# Patient Record
Sex: FEMALE | Race: BLACK OR AFRICAN AMERICAN | NOT HISPANIC OR LATINO | Employment: OTHER | URBAN - METROPOLITAN AREA
[De-identification: names, ages, dates, MRNs, and addresses within clinical notes are randomized per-mention and may not be internally consistent; named-entity substitution may affect disease eponyms.]

---

## 2019-07-26 ENCOUNTER — APPOINTMENT (OUTPATIENT)
Dept: GENERAL RADIOLOGY | Facility: HOSPITAL | Age: 64
End: 2019-07-26

## 2019-07-26 ENCOUNTER — HOSPITAL ENCOUNTER (INPATIENT)
Facility: HOSPITAL | Age: 64
LOS: 9 days | Discharge: HOME | End: 2019-08-04
Attending: EMERGENCY MEDICINE | Admitting: INTERNAL MEDICINE

## 2019-07-26 DIAGNOSIS — E11.10 DIABETIC KETOACIDOSIS WITHOUT COMA ASSOCIATED WITH TYPE 2 DIABETES MELLITUS (HCC): Primary | ICD-10-CM

## 2019-07-26 PROBLEM — N18.2 CKD (CHRONIC KIDNEY DISEASE), STAGE II: Status: ACTIVE | Noted: 2019-07-26

## 2019-07-26 PROBLEM — Z95.2 HISTORY OF HEART VALVE REPLACEMENT: Status: ACTIVE | Noted: 2019-07-26

## 2019-07-26 PROBLEM — Z79.01 CHRONIC ANTICOAGULATION: Status: ACTIVE | Noted: 2019-07-26

## 2019-07-26 PROBLEM — N39.0 UTI (URINARY TRACT INFECTION), BACTERIAL: Status: ACTIVE | Noted: 2019-07-26

## 2019-07-26 PROBLEM — A49.9 UTI (URINARY TRACT INFECTION), BACTERIAL: Status: ACTIVE | Noted: 2019-07-26

## 2019-07-26 PROBLEM — I10 HYPERTENSION: Status: ACTIVE | Noted: 2019-07-26

## 2019-07-26 LAB
ALBUMIN SERPL-MCNC: 4.3 G/DL (ref 3.5–5.2)
ALBUMIN/GLOB SERPL: 1.3 G/DL
ALP SERPL-CCNC: 137 U/L (ref 39–117)
ALT SERPL W P-5'-P-CCNC: 31 U/L (ref 1–33)
ANION GAP SERPL CALCULATED.3IONS-SCNC: 19 MMOL/L (ref 5–15)
ANION GAP SERPL CALCULATED.3IONS-SCNC: 24 MMOL/L (ref 5–15)
AST SERPL-CCNC: 45 U/L (ref 1–32)
ATMOSPHERIC PRESS: ABNORMAL MMHG
B-OH-BUTYR SERPL-SCNC: 4.96 MMOL/L (ref 0.02–0.27)
BACTERIA UR QL AUTO: ABNORMAL /HPF
BASE EXCESS BLDV CALC-SCNC: -6.6 MMOL/L (ref -2–2)
BASOPHILS # BLD AUTO: 0.04 10*3/MM3 (ref 0–0.2)
BASOPHILS NFR BLD AUTO: 0.5 % (ref 0–1.5)
BDY SITE: ABNORMAL
BILIRUB SERPL-MCNC: 1 MG/DL (ref 0.2–1.2)
BILIRUB UR QL STRIP: NEGATIVE
BODY TEMPERATURE: 37 C
BUN BLD-MCNC: 53 MG/DL (ref 8–23)
BUN BLD-MCNC: 62 MG/DL (ref 8–23)
BUN/CREAT SERPL: 34 (ref 7–25)
BUN/CREAT SERPL: 36.3 (ref 7–25)
CALCIUM SPEC-SCNC: 10.1 MG/DL (ref 8.6–10.5)
CALCIUM SPEC-SCNC: 10.3 MG/DL (ref 8.6–10.5)
CHLORIDE SERPL-SCNC: 92 MMOL/L (ref 98–107)
CHLORIDE SERPL-SCNC: 99 MMOL/L (ref 98–107)
CLARITY UR: ABNORMAL
CO2 BLDA-SCNC: 20.1 MMOL/L (ref 23–27)
CO2 SERPL-SCNC: 17 MMOL/L (ref 22–29)
CO2 SERPL-SCNC: 20 MMOL/L (ref 22–29)
COHGB MFR BLD: 2 %
COLOR UR: YELLOW
CREAT BLD-MCNC: 1.56 MG/DL (ref 0.57–1)
CREAT BLD-MCNC: 1.71 MG/DL (ref 0.57–1)
D-LACTATE SERPL-SCNC: 2.2 MMOL/L (ref 0.5–2)
D-LACTATE SERPL-SCNC: 5.8 MMOL/L (ref 0.5–2)
DEPRECATED RDW RBC AUTO: 59.2 FL (ref 37–54)
EOSINOPHIL # BLD AUTO: 0.13 10*3/MM3 (ref 0–0.4)
EOSINOPHIL NFR BLD AUTO: 1.6 % (ref 0.3–6.2)
ERYTHROCYTE [DISTWIDTH] IN BLOOD BY AUTOMATED COUNT: 15.9 % (ref 12.3–15.4)
GFR SERPL CREATININE-BSD FRML MDRD: 36 ML/MIN/1.73
GFR SERPL CREATININE-BSD FRML MDRD: 40 ML/MIN/1.73
GLOBULIN UR ELPH-MCNC: 3.4 GM/DL
GLUCOSE BLD-MCNC: 207 MG/DL (ref 65–99)
GLUCOSE BLD-MCNC: 618 MG/DL (ref 65–99)
GLUCOSE BLDC GLUCOMTR-MCNC: 119 MG/DL (ref 70–130)
GLUCOSE BLDC GLUCOMTR-MCNC: 135 MG/DL (ref 70–130)
GLUCOSE BLDC GLUCOMTR-MCNC: 153 MG/DL (ref 70–130)
GLUCOSE BLDC GLUCOMTR-MCNC: 204 MG/DL (ref 70–130)
GLUCOSE BLDC GLUCOMTR-MCNC: 243 MG/DL (ref 70–130)
GLUCOSE BLDC GLUCOMTR-MCNC: 352 MG/DL (ref 70–130)
GLUCOSE BLDC GLUCOMTR-MCNC: 362 MG/DL (ref 70–130)
GLUCOSE BLDC GLUCOMTR-MCNC: 445 MG/DL (ref 70–130)
GLUCOSE BLDC GLUCOMTR-MCNC: 445 MG/DL (ref 70–130)
GLUCOSE BLDC GLUCOMTR-MCNC: 460 MG/DL (ref 70–130)
GLUCOSE BLDC GLUCOMTR-MCNC: 521 MG/DL (ref 70–130)
GLUCOSE BLDC GLUCOMTR-MCNC: 521 MG/DL (ref 70–130)
GLUCOSE BLDC GLUCOMTR-MCNC: 534 MG/DL (ref 70–130)
GLUCOSE UR STRIP-MCNC: ABNORMAL MG/DL
HBA1C MFR BLD: 8.4 % (ref 4.8–5.6)
HCO3 BLDV-SCNC: 19 MMOL/L (ref 22–28)
HCT VFR BLD AUTO: 33.1 % (ref 34–46.6)
HGB BLD-MCNC: 10.3 G/DL (ref 12–15.9)
HGB BLDA-MCNC: 10.7 G/DL (ref 14–18)
HGB UR QL STRIP.AUTO: ABNORMAL
HOLD SPECIMEN: NORMAL
HOROWITZ INDEX BLD+IHG-RTO: 21 %
HYALINE CASTS UR QL AUTO: ABNORMAL /LPF
IMM GRANULOCYTES # BLD AUTO: 0.05 10*3/MM3 (ref 0–0.05)
IMM GRANULOCYTES NFR BLD AUTO: 0.6 % (ref 0–0.5)
INR PPP: 2.63 (ref 0.85–1.16)
KETONES UR QL STRIP: ABNORMAL
LEUKOCYTE ESTERASE UR QL STRIP.AUTO: ABNORMAL
LYMPHOCYTES # BLD AUTO: 0.29 10*3/MM3 (ref 0.7–3.1)
LYMPHOCYTES NFR BLD AUTO: 3.5 % (ref 19.6–45.3)
MAGNESIUM SERPL-MCNC: 2.3 MG/DL (ref 1.6–2.4)
MAGNESIUM SERPL-MCNC: 2.4 MG/DL (ref 1.6–2.4)
MCH RBC QN AUTO: 31.7 PG (ref 26.6–33)
MCHC RBC AUTO-ENTMCNC: 31.1 G/DL (ref 31.5–35.7)
MCV RBC AUTO: 101.8 FL (ref 79–97)
METHGB BLD QL: 0.9 %
MODALITY: ABNORMAL
MONOCYTES # BLD AUTO: 0.49 10*3/MM3 (ref 0.1–0.9)
MONOCYTES NFR BLD AUTO: 6 % (ref 5–12)
NEUTROPHILS # BLD AUTO: 7.19 10*3/MM3 (ref 1.7–7)
NEUTROPHILS NFR BLD AUTO: 87.8 % (ref 42.7–76)
NITRITE UR QL STRIP: NEGATIVE
NOTE: ABNORMAL
NRBC BLD AUTO-RTO: 0 /100 WBC (ref 0–0.2)
OXYHGB MFR BLDV: 43.5 %
PCO2 BLDV: 37.4 MM HG (ref 41–51)
PH BLDV: 7.31 PH UNITS
PH UR STRIP.AUTO: <=5 [PH] (ref 5–8)
PHOSPHATE SERPL-MCNC: 3.5 MG/DL (ref 2.5–4.5)
PHOSPHATE SERPL-MCNC: 4.4 MG/DL (ref 2.5–4.5)
PLATELET # BLD AUTO: 278 10*3/MM3 (ref 140–450)
PMV BLD AUTO: 10.9 FL (ref 6–12)
PO2 BLDV: 27.1 MM HG (ref 27–53)
POTASSIUM BLD-SCNC: 4.7 MMOL/L (ref 3.5–5.2)
POTASSIUM BLD-SCNC: 4.7 MMOL/L (ref 3.5–5.2)
PROT SERPL-MCNC: 7.7 G/DL (ref 6–8.5)
PROT UR QL STRIP: ABNORMAL
PROTHROMBIN TIME: 27.1 SECONDS (ref 11.2–14.3)
RBC # BLD AUTO: 3.25 10*6/MM3 (ref 3.77–5.28)
RBC # UR: ABNORMAL /HPF
REF LAB TEST METHOD: ABNORMAL
SODIUM BLD-SCNC: 133 MMOL/L (ref 136–145)
SODIUM BLD-SCNC: 138 MMOL/L (ref 136–145)
SP GR UR STRIP: 1.01 (ref 1–1.03)
SQUAMOUS #/AREA URNS HPF: ABNORMAL /HPF
UROBILINOGEN UR QL STRIP: ABNORMAL
VENTILATOR MODE: ABNORMAL
WBC NRBC COR # BLD: 8.19 10*3/MM3 (ref 3.4–10.8)
WBC UR QL AUTO: ABNORMAL /HPF
WHOLE BLOOD HOLD SPECIMEN: NORMAL
WHOLE BLOOD HOLD SPECIMEN: NORMAL

## 2019-07-26 PROCEDURE — 87086 URINE CULTURE/COLONY COUNT: CPT | Performed by: FAMILY MEDICINE

## 2019-07-26 PROCEDURE — 87040 BLOOD CULTURE FOR BACTERIA: CPT | Performed by: EMERGENCY MEDICINE

## 2019-07-26 PROCEDURE — A9270 NON-COVERED ITEM OR SERVICE: HCPCS

## 2019-07-26 PROCEDURE — A9270 NON-COVERED ITEM OR SERVICE: HCPCS | Performed by: PHYSICIAN ASSISTANT

## 2019-07-26 PROCEDURE — 80053 COMPREHEN METABOLIC PANEL: CPT | Performed by: EMERGENCY MEDICINE

## 2019-07-26 PROCEDURE — A9270 NON-COVERED ITEM OR SERVICE: HCPCS | Performed by: FAMILY MEDICINE

## 2019-07-26 PROCEDURE — 83735 ASSAY OF MAGNESIUM: CPT | Performed by: FAMILY MEDICINE

## 2019-07-26 PROCEDURE — 82820 HEMOGLOBIN-OXYGEN AFFINITY: CPT

## 2019-07-26 PROCEDURE — 83605 ASSAY OF LACTIC ACID: CPT | Performed by: EMERGENCY MEDICINE

## 2019-07-26 PROCEDURE — 63710000001 INSULIN REGULAR HUMAN PER 5 UNITS: Performed by: FAMILY MEDICINE

## 2019-07-26 PROCEDURE — 87186 SC STD MICRODIL/AGAR DIL: CPT | Performed by: FAMILY MEDICINE

## 2019-07-26 PROCEDURE — A9270 NON-COVERED ITEM OR SERVICE: HCPCS | Performed by: NURSE PRACTITIONER

## 2019-07-26 PROCEDURE — 99291 CRITICAL CARE FIRST HOUR: CPT | Performed by: FAMILY MEDICINE

## 2019-07-26 PROCEDURE — 99291 CRITICAL CARE FIRST HOUR: CPT

## 2019-07-26 PROCEDURE — 85025 COMPLETE CBC W/AUTO DIFF WBC: CPT

## 2019-07-26 PROCEDURE — 85610 PROTHROMBIN TIME: CPT | Performed by: PHYSICIAN ASSISTANT

## 2019-07-26 PROCEDURE — 81001 URINALYSIS AUTO W/SCOPE: CPT | Performed by: FAMILY MEDICINE

## 2019-07-26 PROCEDURE — 71045 X-RAY EXAM CHEST 1 VIEW: CPT

## 2019-07-26 PROCEDURE — 63710000001 INSULIN REGULAR HUMAN PER 5 UNITS: Performed by: PHYSICIAN ASSISTANT

## 2019-07-26 PROCEDURE — 63710000001 BUDESONIDE-FORMOTEROL 80-4.5 MCG/ACT AEROSOL 6.9 G INHALER: Performed by: FAMILY MEDICINE

## 2019-07-26 PROCEDURE — 87088 URINE BACTERIA CULTURE: CPT | Performed by: FAMILY MEDICINE

## 2019-07-26 PROCEDURE — 83036 HEMOGLOBIN GLYCOSYLATED A1C: CPT | Performed by: FAMILY MEDICINE

## 2019-07-26 PROCEDURE — 63710000001 WARFARIN 7.5 MG TABLET

## 2019-07-26 PROCEDURE — 82962 GLUCOSE BLOOD TEST: CPT

## 2019-07-26 PROCEDURE — 63710000001 ACETAMINOPHEN 650 MG SUPPOSITORY: Performed by: NURSE PRACTITIONER

## 2019-07-26 PROCEDURE — 25810000003 SODIUM CHLORIDE 0.9 % WITH KCL 20 MEQ 20-0.9 MEQ/L-% SOLUTION: Performed by: FAMILY MEDICINE

## 2019-07-26 PROCEDURE — 94799 UNLISTED PULMONARY SVC/PX: CPT

## 2019-07-26 PROCEDURE — 87186 SC STD MICRODIL/AGAR DIL: CPT | Performed by: EMERGENCY MEDICINE

## 2019-07-26 PROCEDURE — 84100 ASSAY OF PHOSPHORUS: CPT | Performed by: FAMILY MEDICINE

## 2019-07-26 PROCEDURE — 82010 KETONE BODYS QUAN: CPT | Performed by: EMERGENCY MEDICINE

## 2019-07-26 PROCEDURE — 25010000002 ONDANSETRON PER 1 MG: Performed by: PHYSICIAN ASSISTANT

## 2019-07-26 PROCEDURE — 82805 BLOOD GASES W/O2 SATURATION: CPT

## 2019-07-26 PROCEDURE — 87150 DNA/RNA AMPLIFIED PROBE: CPT | Performed by: EMERGENCY MEDICINE

## 2019-07-26 PROCEDURE — 25010000002 CEFTRIAXONE PER 250 MG: Performed by: FAMILY MEDICINE

## 2019-07-26 PROCEDURE — 25010000002 ONDANSETRON PER 1 MG: Performed by: FAMILY MEDICINE

## 2019-07-26 PROCEDURE — 63710000001 CARVEDILOL 6.25 MG TABLET: Performed by: FAMILY MEDICINE

## 2019-07-26 RX ORDER — PANTOPRAZOLE SODIUM 40 MG/1
40 TABLET, DELAYED RELEASE ORAL DAILY
Status: DISCONTINUED | OUTPATIENT
Start: 2019-07-27 | End: 2019-08-04 | Stop reason: HOSPADM

## 2019-07-26 RX ORDER — ISOSORBIDE MONONITRATE 30 MG/1
30 TABLET, EXTENDED RELEASE ORAL DAILY
Status: DISCONTINUED | OUTPATIENT
Start: 2019-07-27 | End: 2019-07-27

## 2019-07-26 RX ORDER — SODIUM CHLORIDE 0.9 % (FLUSH) 0.9 %
30 SYRINGE (ML) INJECTION ONCE AS NEEDED
Status: DISCONTINUED | OUTPATIENT
Start: 2019-07-26 | End: 2019-08-04 | Stop reason: HOSPADM

## 2019-07-26 RX ORDER — CARVEDILOL 6.25 MG/1
6.25 TABLET ORAL 2 TIMES DAILY WITH MEALS
COMMUNITY

## 2019-07-26 RX ORDER — PANTOPRAZOLE SODIUM 40 MG/1
40 TABLET, DELAYED RELEASE ORAL DAILY
COMMUNITY

## 2019-07-26 RX ORDER — WARFARIN SODIUM 7.5 MG/1
3.75 TABLET ORAL SEE ADMIN INSTRUCTIONS
COMMUNITY

## 2019-07-26 RX ORDER — BUDESONIDE AND FORMOTEROL FUMARATE DIHYDRATE 80; 4.5 UG/1; UG/1
2 AEROSOL RESPIRATORY (INHALATION)
Status: DISCONTINUED | OUTPATIENT
Start: 2019-07-26 | End: 2019-08-04 | Stop reason: HOSPADM

## 2019-07-26 RX ORDER — ONDANSETRON 2 MG/ML
4 INJECTION INTRAMUSCULAR; INTRAVENOUS ONCE
Status: COMPLETED | OUTPATIENT
Start: 2019-07-26 | End: 2019-07-26

## 2019-07-26 RX ORDER — ACETAMINOPHEN 650 MG/1
650 SUPPOSITORY RECTAL EVERY 4 HOURS PRN
Status: DISCONTINUED | OUTPATIENT
Start: 2019-07-26 | End: 2019-08-04 | Stop reason: HOSPADM

## 2019-07-26 RX ORDER — ALLOPURINOL 100 MG/1
100 TABLET ORAL DAILY
Status: ON HOLD | COMMUNITY
End: 2019-07-27

## 2019-07-26 RX ORDER — CARVEDILOL 6.25 MG/1
6.25 TABLET ORAL 2 TIMES DAILY WITH MEALS
Status: DISCONTINUED | OUTPATIENT
Start: 2019-07-26 | End: 2019-08-04 | Stop reason: HOSPADM

## 2019-07-26 RX ORDER — WARFARIN SODIUM 7.5 MG/1
7.5 TABLET ORAL
Status: DISCONTINUED | OUTPATIENT
Start: 2019-07-26 | End: 2019-07-26

## 2019-07-26 RX ORDER — ONDANSETRON 2 MG/ML
4 INJECTION INTRAMUSCULAR; INTRAVENOUS EVERY 6 HOURS PRN
Status: DISCONTINUED | OUTPATIENT
Start: 2019-07-26 | End: 2019-08-04 | Stop reason: HOSPADM

## 2019-07-26 RX ORDER — ISOSORBIDE MONONITRATE 30 MG/1
30 TABLET, EXTENDED RELEASE ORAL DAILY
COMMUNITY

## 2019-07-26 RX ORDER — INSULIN GLARGINE 100 [IU]/ML
14 INJECTION, SOLUTION SUBCUTANEOUS 2 TIMES DAILY
Status: ON HOLD | COMMUNITY
End: 2019-08-04 | Stop reason: SDUPTHER

## 2019-07-26 RX ORDER — FEXOFENADINE HCL 180 MG/1
180 TABLET ORAL DAILY
COMMUNITY
End: 2019-07-26

## 2019-07-26 RX ORDER — SODIUM CHLORIDE 0.9 % (FLUSH) 0.9 %
10 SYRINGE (ML) INJECTION ONCE AS NEEDED
Status: DISCONTINUED | OUTPATIENT
Start: 2019-07-26 | End: 2019-08-04 | Stop reason: HOSPADM

## 2019-07-26 RX ORDER — ALLOPURINOL 300 MG/1
300 TABLET ORAL DAILY
COMMUNITY
Start: 2019-07-26

## 2019-07-26 RX ORDER — ACETAMINOPHEN 325 MG/1
650 TABLET ORAL EVERY 4 HOURS PRN
Status: DISCONTINUED | OUTPATIENT
Start: 2019-07-26 | End: 2019-08-04 | Stop reason: HOSPADM

## 2019-07-26 RX ORDER — FLUTICASONE PROPIONATE 50 MCG
2 SPRAY, SUSPENSION (ML) NASAL DAILY PRN
COMMUNITY

## 2019-07-26 RX ORDER — WARFARIN SODIUM 7.5 MG/1
7.5 TABLET ORAL SEE ADMIN INSTRUCTIONS
COMMUNITY

## 2019-07-26 RX ORDER — ATORVASTATIN CALCIUM 20 MG/1
20 TABLET, FILM COATED ORAL DAILY
Status: DISCONTINUED | OUTPATIENT
Start: 2019-07-27 | End: 2019-08-04 | Stop reason: HOSPADM

## 2019-07-26 RX ORDER — SODIUM CHLORIDE 0.9 % (FLUSH) 0.9 %
3-10 SYRINGE (ML) INJECTION AS NEEDED
Status: DISCONTINUED | OUTPATIENT
Start: 2019-07-26 | End: 2019-08-04 | Stop reason: HOSPADM

## 2019-07-26 RX ORDER — NITROGLYCERIN 0.4 MG/1
0.4 TABLET SUBLINGUAL
COMMUNITY

## 2019-07-26 RX ORDER — SODIUM CHLORIDE 9 MG/ML
10 INJECTION, SOLUTION INTRAVENOUS CONTINUOUS PRN
Status: DISCONTINUED | OUTPATIENT
Start: 2019-07-26 | End: 2019-08-04 | Stop reason: HOSPADM

## 2019-07-26 RX ORDER — BISACODYL 5 MG/1
5 TABLET, DELAYED RELEASE ORAL DAILY PRN
Status: DISCONTINUED | OUTPATIENT
Start: 2019-07-26 | End: 2019-08-04 | Stop reason: HOSPADM

## 2019-07-26 RX ORDER — SACCHAROMYCES BOULARDII 250 MG
250 CAPSULE ORAL DAILY
Status: DISCONTINUED | OUTPATIENT
Start: 2019-07-27 | End: 2019-08-04 | Stop reason: HOSPADM

## 2019-07-26 RX ORDER — LEVOTHYROXINE SODIUM 0.15 MG/1
150 TABLET ORAL
Status: DISCONTINUED | OUTPATIENT
Start: 2019-07-27 | End: 2019-08-04 | Stop reason: HOSPADM

## 2019-07-26 RX ORDER — LEVOTHYROXINE SODIUM 0.15 MG/1
150 TABLET ORAL DAILY
COMMUNITY

## 2019-07-26 RX ORDER — WARFARIN SODIUM 7.5 MG/1
3.75 TABLET ORAL
Status: DISCONTINUED | OUTPATIENT
Start: 2019-07-26 | End: 2019-07-28

## 2019-07-26 RX ORDER — CALCIUM CARBONATE 200(500)MG
2 TABLET,CHEWABLE ORAL 2 TIMES DAILY PRN
Status: DISCONTINUED | OUTPATIENT
Start: 2019-07-26 | End: 2019-08-04 | Stop reason: HOSPADM

## 2019-07-26 RX ORDER — SODIUM CHLORIDE AND POTASSIUM CHLORIDE 150; 900 MG/100ML; MG/100ML
250 INJECTION, SOLUTION INTRAVENOUS CONTINUOUS PRN
Status: DISCONTINUED | OUTPATIENT
Start: 2019-07-26 | End: 2019-08-04 | Stop reason: HOSPADM

## 2019-07-26 RX ORDER — SODIUM CHLORIDE 450 MG/100ML
250 INJECTION, SOLUTION INTRAVENOUS CONTINUOUS PRN
Status: DISCONTINUED | OUTPATIENT
Start: 2019-07-26 | End: 2019-08-04 | Stop reason: HOSPADM

## 2019-07-26 RX ORDER — SIMVASTATIN 40 MG
40 TABLET ORAL NIGHTLY
COMMUNITY

## 2019-07-26 RX ORDER — CETIRIZINE HYDROCHLORIDE 10 MG/1
5 TABLET ORAL DAILY
Status: DISCONTINUED | OUTPATIENT
Start: 2019-07-27 | End: 2019-08-04 | Stop reason: HOSPADM

## 2019-07-26 RX ORDER — SODIUM CHLORIDE 9 MG/ML
30 INJECTION, SOLUTION INTRAVENOUS CONTINUOUS PRN
Status: DISCONTINUED | OUTPATIENT
Start: 2019-07-26 | End: 2019-08-04 | Stop reason: HOSPADM

## 2019-07-26 RX ORDER — FUROSEMIDE 20 MG/1
20 TABLET ORAL 2 TIMES DAILY
Status: ON HOLD | COMMUNITY
End: 2019-08-04 | Stop reason: SDUPTHER

## 2019-07-26 RX ORDER — WARFARIN SODIUM 7.5 MG/1
7.5 TABLET ORAL
Status: DISCONTINUED | OUTPATIENT
Start: 2019-07-27 | End: 2019-07-27

## 2019-07-26 RX ORDER — HYDRALAZINE HYDROCHLORIDE 50 MG/1
50 TABLET, FILM COATED ORAL 2 TIMES DAILY
COMMUNITY

## 2019-07-26 RX ORDER — SODIUM CHLORIDE 0.9 % (FLUSH) 0.9 %
3 SYRINGE (ML) INJECTION EVERY 12 HOURS SCHEDULED
Status: DISCONTINUED | OUTPATIENT
Start: 2019-07-26 | End: 2019-08-04 | Stop reason: HOSPADM

## 2019-07-26 RX ORDER — SACCHAROMYCES BOULARDII 250 MG
250 CAPSULE ORAL 2 TIMES DAILY
COMMUNITY
End: 2019-07-26

## 2019-07-26 RX ORDER — SODIUM CHLORIDE 0.9 % (FLUSH) 0.9 %
10 SYRINGE (ML) INJECTION AS NEEDED
Status: DISCONTINUED | OUTPATIENT
Start: 2019-07-26 | End: 2019-08-04 | Stop reason: HOSPADM

## 2019-07-26 RX ORDER — ONDANSETRON 4 MG/1
4 TABLET, ORALLY DISINTEGRATING ORAL EVERY 8 HOURS PRN
COMMUNITY

## 2019-07-26 RX ORDER — SODIUM CHLORIDE AND POTASSIUM CHLORIDE 300; 900 MG/100ML; MG/100ML
250 INJECTION, SOLUTION INTRAVENOUS CONTINUOUS PRN
Status: DISCONTINUED | OUTPATIENT
Start: 2019-07-26 | End: 2019-08-04 | Stop reason: HOSPADM

## 2019-07-26 RX ORDER — FERROUS SULFATE 325(65) MG
325 TABLET ORAL
COMMUNITY

## 2019-07-26 RX ORDER — ASPIRIN 81 MG/1
81 TABLET ORAL DAILY
COMMUNITY

## 2019-07-26 RX ORDER — ALLOPURINOL 100 MG/1
100 TABLET ORAL DAILY
Status: DISCONTINUED | OUTPATIENT
Start: 2019-07-27 | End: 2019-08-04 | Stop reason: HOSPADM

## 2019-07-26 RX ORDER — DEXTROSE MONOHYDRATE 25 G/50ML
12.5 INJECTION, SOLUTION INTRAVENOUS AS NEEDED
Status: DISCONTINUED | OUTPATIENT
Start: 2019-07-26 | End: 2019-08-04 | Stop reason: HOSPADM

## 2019-07-26 RX ORDER — DEXTROSE AND SODIUM CHLORIDE 5; .45 G/100ML; G/100ML
250 INJECTION, SOLUTION INTRAVENOUS CONTINUOUS PRN
Status: DISCONTINUED | OUTPATIENT
Start: 2019-07-26 | End: 2019-08-04 | Stop reason: HOSPADM

## 2019-07-26 RX ORDER — DEXTROSE, SODIUM CHLORIDE, AND POTASSIUM CHLORIDE 5; .45; .3 G/100ML; G/100ML; G/100ML
250 INJECTION INTRAVENOUS CONTINUOUS PRN
Status: DISCONTINUED | OUTPATIENT
Start: 2019-07-26 | End: 2019-08-04 | Stop reason: HOSPADM

## 2019-07-26 RX ORDER — DEXTROSE AND SODIUM CHLORIDE 5; .45 G/100ML; G/100ML
125 INJECTION, SOLUTION INTRAVENOUS CONTINUOUS
Status: DISCONTINUED | OUTPATIENT
Start: 2019-07-27 | End: 2019-07-27

## 2019-07-26 RX ORDER — DEXTROSE MONOHYDRATE 50 MG/ML
50 INJECTION, SOLUTION INTRAVENOUS CONTINUOUS
Status: DISCONTINUED | OUTPATIENT
Start: 2019-07-27 | End: 2019-07-26

## 2019-07-26 RX ORDER — FERROUS SULFATE 325(65) MG
325 TABLET ORAL
Status: DISCONTINUED | OUTPATIENT
Start: 2019-07-27 | End: 2019-08-04 | Stop reason: HOSPADM

## 2019-07-26 RX ORDER — SODIUM CHLORIDE 9 MG/ML
250 INJECTION, SOLUTION INTRAVENOUS CONTINUOUS PRN
Status: DISCONTINUED | OUTPATIENT
Start: 2019-07-26 | End: 2019-08-04 | Stop reason: HOSPADM

## 2019-07-26 RX ORDER — SODIUM CHLORIDE AND POTASSIUM CHLORIDE 150; 450 MG/100ML; MG/100ML
250 INJECTION, SOLUTION INTRAVENOUS CONTINUOUS PRN
Status: DISCONTINUED | OUTPATIENT
Start: 2019-07-26 | End: 2019-08-04 | Stop reason: HOSPADM

## 2019-07-26 RX ORDER — DEXTROSE, SODIUM CHLORIDE, AND POTASSIUM CHLORIDE 5; .45; .15 G/100ML; G/100ML; G/100ML
250 INJECTION INTRAVENOUS CONTINUOUS PRN
Status: DISCONTINUED | OUTPATIENT
Start: 2019-07-26 | End: 2019-08-04 | Stop reason: HOSPADM

## 2019-07-26 RX ORDER — ACETAMINOPHEN 325 MG/1
975 TABLET ORAL EVERY 6 HOURS PRN
COMMUNITY

## 2019-07-26 RX ADMIN — SODIUM CHLORIDE 10 UNITS/HR: 9 INJECTION, SOLUTION INTRAVENOUS at 18:47

## 2019-07-26 RX ADMIN — SODIUM CHLORIDE 1000 ML: 9 INJECTION, SOLUTION INTRAVENOUS at 15:58

## 2019-07-26 RX ADMIN — ONDANSETRON 4 MG: 2 INJECTION INTRAMUSCULAR; INTRAVENOUS at 16:29

## 2019-07-26 RX ADMIN — BUDESONIDE AND FORMOTEROL FUMARATE DIHYDRATE 2 PUFF: 80; 4.5 AEROSOL RESPIRATORY (INHALATION) at 21:30

## 2019-07-26 RX ADMIN — ONDANSETRON 4 MG: 2 INJECTION INTRAMUSCULAR; INTRAVENOUS at 22:48

## 2019-07-26 RX ADMIN — SODIUM CHLORIDE 7 UNITS/HR: 9 INJECTION, SOLUTION INTRAVENOUS at 20:02

## 2019-07-26 RX ADMIN — SODIUM CHLORIDE, PRESERVATIVE FREE 3 ML: 5 INJECTION INTRAVENOUS at 20:02

## 2019-07-26 RX ADMIN — SODIUM CHLORIDE 1000 ML: 9 INJECTION, SOLUTION INTRAVENOUS at 16:29

## 2019-07-26 RX ADMIN — INSULIN HUMAN 7 UNITS: 100 INJECTION, SOLUTION PARENTERAL at 16:24

## 2019-07-26 RX ADMIN — ACETAMINOPHEN 650 MG: 650 SUPPOSITORY RECTAL at 23:10

## 2019-07-26 RX ADMIN — SODIUM CHLORIDE 4 UNITS/HR: 9 INJECTION, SOLUTION INTRAVENOUS at 16:25

## 2019-07-26 RX ADMIN — WARFARIN SODIUM 3.75 MG: 7.5 TABLET ORAL at 21:40

## 2019-07-26 RX ADMIN — DEXTROSE MONOHYDRATE 50 ML/HR: 50 INJECTION, SOLUTION INTRAVENOUS at 23:10

## 2019-07-26 RX ADMIN — POTASSIUM CHLORIDE AND SODIUM CHLORIDE 250 ML/HR: 900; 150 INJECTION, SOLUTION INTRAVENOUS at 20:02

## 2019-07-26 RX ADMIN — CARVEDILOL 6.25 MG: 12.5 TABLET, FILM COATED ORAL at 21:38

## 2019-07-27 ENCOUNTER — APPOINTMENT (OUTPATIENT)
Dept: CT IMAGING | Facility: HOSPITAL | Age: 64
End: 2019-07-27

## 2019-07-27 LAB
ANION GAP SERPL CALCULATED.3IONS-SCNC: 12 MMOL/L (ref 5–15)
ANION GAP SERPL CALCULATED.3IONS-SCNC: 12 MMOL/L (ref 5–15)
ANION GAP SERPL CALCULATED.3IONS-SCNC: 14 MMOL/L (ref 5–15)
ANION GAP SERPL CALCULATED.3IONS-SCNC: 15 MMOL/L (ref 5–15)
ANION GAP SERPL CALCULATED.3IONS-SCNC: 16 MMOL/L (ref 5–15)
ANION GAP SERPL CALCULATED.3IONS-SCNC: 9 MMOL/L (ref 5–15)
BACTERIA BLD CULT: ABNORMAL
BUN BLD-MCNC: 45 MG/DL (ref 8–23)
BUN BLD-MCNC: 45 MG/DL (ref 8–23)
BUN BLD-MCNC: 46 MG/DL (ref 8–23)
BUN BLD-MCNC: 49 MG/DL (ref 8–23)
BUN BLD-MCNC: 49 MG/DL (ref 8–23)
BUN BLD-MCNC: 51 MG/DL (ref 8–23)
BUN/CREAT SERPL: 31.2 (ref 7–25)
BUN/CREAT SERPL: 31.8 (ref 7–25)
BUN/CREAT SERPL: 32.7 (ref 7–25)
BUN/CREAT SERPL: 32.8 (ref 7–25)
BUN/CREAT SERPL: 35.4 (ref 7–25)
BUN/CREAT SERPL: 36.3 (ref 7–25)
CALCIUM SPEC-SCNC: 8.2 MG/DL (ref 8.6–10.5)
CALCIUM SPEC-SCNC: 8.6 MG/DL (ref 8.6–10.5)
CALCIUM SPEC-SCNC: 8.6 MG/DL (ref 8.6–10.5)
CALCIUM SPEC-SCNC: 8.7 MG/DL (ref 8.6–10.5)
CALCIUM SPEC-SCNC: 8.8 MG/DL (ref 8.6–10.5)
CALCIUM SPEC-SCNC: 9.3 MG/DL (ref 8.6–10.5)
CHLORIDE SERPL-SCNC: 101 MMOL/L (ref 98–107)
CHLORIDE SERPL-SCNC: 101 MMOL/L (ref 98–107)
CHLORIDE SERPL-SCNC: 102 MMOL/L (ref 98–107)
CHLORIDE SERPL-SCNC: 103 MMOL/L (ref 98–107)
CO2 SERPL-SCNC: 23 MMOL/L (ref 22–29)
CO2 SERPL-SCNC: 26 MMOL/L (ref 22–29)
CREAT BLD-MCNC: 1.24 MG/DL (ref 0.57–1)
CREAT BLD-MCNC: 1.3 MG/DL (ref 0.57–1)
CREAT BLD-MCNC: 1.37 MG/DL (ref 0.57–1)
CREAT BLD-MCNC: 1.54 MG/DL (ref 0.57–1)
CREAT BLD-MCNC: 1.56 MG/DL (ref 0.57–1)
CREAT BLD-MCNC: 1.57 MG/DL (ref 0.57–1)
D-LACTATE SERPL-SCNC: 0.9 MMOL/L (ref 0.5–2)
GFR SERPL CREATININE-BSD FRML MDRD: 40 ML/MIN/1.73
GFR SERPL CREATININE-BSD FRML MDRD: 40 ML/MIN/1.73
GFR SERPL CREATININE-BSD FRML MDRD: 41 ML/MIN/1.73
GFR SERPL CREATININE-BSD FRML MDRD: 47 ML/MIN/1.73
GFR SERPL CREATININE-BSD FRML MDRD: 50 ML/MIN/1.73
GFR SERPL CREATININE-BSD FRML MDRD: 53 ML/MIN/1.73
GLUCOSE BLD-MCNC: 100 MG/DL (ref 65–99)
GLUCOSE BLD-MCNC: 115 MG/DL (ref 65–99)
GLUCOSE BLD-MCNC: 153 MG/DL (ref 65–99)
GLUCOSE BLD-MCNC: 219 MG/DL (ref 65–99)
GLUCOSE BLD-MCNC: 220 MG/DL (ref 65–99)
GLUCOSE BLD-MCNC: 230 MG/DL (ref 65–99)
GLUCOSE BLDC GLUCOMTR-MCNC: 105 MG/DL (ref 70–130)
GLUCOSE BLDC GLUCOMTR-MCNC: 108 MG/DL (ref 70–130)
GLUCOSE BLDC GLUCOMTR-MCNC: 109 MG/DL (ref 70–130)
GLUCOSE BLDC GLUCOMTR-MCNC: 111 MG/DL (ref 70–130)
GLUCOSE BLDC GLUCOMTR-MCNC: 134 MG/DL (ref 70–130)
GLUCOSE BLDC GLUCOMTR-MCNC: 140 MG/DL (ref 70–130)
GLUCOSE BLDC GLUCOMTR-MCNC: 141 MG/DL (ref 70–130)
GLUCOSE BLDC GLUCOMTR-MCNC: 160 MG/DL (ref 70–130)
GLUCOSE BLDC GLUCOMTR-MCNC: 165 MG/DL (ref 70–130)
GLUCOSE BLDC GLUCOMTR-MCNC: 192 MG/DL (ref 70–130)
GLUCOSE BLDC GLUCOMTR-MCNC: 195 MG/DL (ref 70–130)
GLUCOSE BLDC GLUCOMTR-MCNC: 205 MG/DL (ref 70–130)
GLUCOSE BLDC GLUCOMTR-MCNC: 208 MG/DL (ref 70–130)
GLUCOSE BLDC GLUCOMTR-MCNC: 214 MG/DL (ref 70–130)
GLUCOSE BLDC GLUCOMTR-MCNC: 240 MG/DL (ref 70–130)
GLUCOSE BLDC GLUCOMTR-MCNC: 273 MG/DL (ref 70–130)
GLUCOSE BLDC GLUCOMTR-MCNC: 316 MG/DL (ref 70–130)
INR PPP: 3.47 (ref 0.85–1.16)
MAGNESIUM SERPL-MCNC: 2.2 MG/DL (ref 1.6–2.4)
MAGNESIUM SERPL-MCNC: 2.3 MG/DL (ref 1.6–2.4)
MAGNESIUM SERPL-MCNC: 2.5 MG/DL (ref 1.6–2.4)
PHOSPHATE SERPL-MCNC: 3.3 MG/DL (ref 2.5–4.5)
PHOSPHATE SERPL-MCNC: 4 MG/DL (ref 2.5–4.5)
PHOSPHATE SERPL-MCNC: 4.3 MG/DL (ref 2.5–4.5)
POTASSIUM BLD-SCNC: 3.8 MMOL/L (ref 3.5–5.2)
POTASSIUM BLD-SCNC: 3.9 MMOL/L (ref 3.5–5.2)
POTASSIUM BLD-SCNC: 3.9 MMOL/L (ref 3.5–5.2)
POTASSIUM BLD-SCNC: 4.1 MMOL/L (ref 3.5–5.2)
POTASSIUM BLD-SCNC: 4.2 MMOL/L (ref 3.5–5.2)
POTASSIUM BLD-SCNC: 4.4 MMOL/L (ref 3.5–5.2)
PROTHROMBIN TIME: 33.5 SECONDS (ref 11.2–14.3)
SODIUM BLD-SCNC: 136 MMOL/L (ref 136–145)
SODIUM BLD-SCNC: 138 MMOL/L (ref 136–145)
SODIUM BLD-SCNC: 138 MMOL/L (ref 136–145)
SODIUM BLD-SCNC: 140 MMOL/L (ref 136–145)

## 2019-07-27 PROCEDURE — 63710000001 INSULIN DETEMIR PER 5 UNITS: Performed by: FAMILY MEDICINE

## 2019-07-27 PROCEDURE — 99233 SBSQ HOSP IP/OBS HIGH 50: CPT | Performed by: INTERNAL MEDICINE

## 2019-07-27 PROCEDURE — 82962 GLUCOSE BLOOD TEST: CPT

## 2019-07-27 PROCEDURE — A9270 NON-COVERED ITEM OR SERVICE: HCPCS | Performed by: FAMILY MEDICINE

## 2019-07-27 PROCEDURE — 0 DIATRIZOATE MEGLUMINE & SODIUM PER 1 ML

## 2019-07-27 PROCEDURE — 63710000001 INSULIN LISPRO (HUMAN) PER 5 UNITS: Performed by: INTERNAL MEDICINE

## 2019-07-27 PROCEDURE — 94799 UNLISTED PULMONARY SVC/PX: CPT

## 2019-07-27 PROCEDURE — 25010000002 MEROPENEM: Performed by: INTERNAL MEDICINE

## 2019-07-27 PROCEDURE — 25010000002 CEFTRIAXONE PER 250 MG: Performed by: FAMILY MEDICINE

## 2019-07-27 PROCEDURE — 84100 ASSAY OF PHOSPHORUS: CPT | Performed by: FAMILY MEDICINE

## 2019-07-27 PROCEDURE — 63710000001 FERROUS SULFATE 325 (65 FE) MG TABLET: Performed by: FAMILY MEDICINE

## 2019-07-27 PROCEDURE — 83605 ASSAY OF LACTIC ACID: CPT | Performed by: FAMILY MEDICINE

## 2019-07-27 PROCEDURE — 85610 PROTHROMBIN TIME: CPT | Performed by: FAMILY MEDICINE

## 2019-07-27 PROCEDURE — 80048 BASIC METABOLIC PNL TOTAL CA: CPT | Performed by: FAMILY MEDICINE

## 2019-07-27 PROCEDURE — 83735 ASSAY OF MAGNESIUM: CPT | Performed by: FAMILY MEDICINE

## 2019-07-27 PROCEDURE — 83735 ASSAY OF MAGNESIUM: CPT | Performed by: INTERNAL MEDICINE

## 2019-07-27 PROCEDURE — 63710000001 INSULIN REGULAR HUMAN PER 5 UNITS: Performed by: FAMILY MEDICINE

## 2019-07-27 PROCEDURE — 74176 CT ABD & PELVIS W/O CONTRAST: CPT

## 2019-07-27 PROCEDURE — 84100 ASSAY OF PHOSPHORUS: CPT | Performed by: INTERNAL MEDICINE

## 2019-07-27 PROCEDURE — 63710000001 CARVEDILOL 6.25 MG TABLET: Performed by: FAMILY MEDICINE

## 2019-07-27 PROCEDURE — 63710000001 LEVOTHYROXINE 150 MCG TABLET: Performed by: FAMILY MEDICINE

## 2019-07-27 RX ORDER — SODIUM CHLORIDE 450 MG/100ML
50 INJECTION, SOLUTION INTRAVENOUS CONTINUOUS
Status: ACTIVE | OUTPATIENT
Start: 2019-07-27 | End: 2019-07-29

## 2019-07-27 RX ORDER — WARFARIN SODIUM 7.5 MG/1
7.5 TABLET ORAL
Status: DISCONTINUED | OUTPATIENT
Start: 2019-07-30 | End: 2019-07-28

## 2019-07-27 RX ORDER — DEXTROSE MONOHYDRATE 25 G/50ML
25 INJECTION, SOLUTION INTRAVENOUS
Status: DISCONTINUED | OUTPATIENT
Start: 2019-07-27 | End: 2019-08-04 | Stop reason: HOSPADM

## 2019-07-27 RX ORDER — FLUTICASONE PROPIONATE 50 MCG
2 SPRAY, SUSPENSION (ML) NASAL DAILY PRN
Status: DISCONTINUED | OUTPATIENT
Start: 2019-07-27 | End: 2019-08-04 | Stop reason: HOSPADM

## 2019-07-27 RX ORDER — ISOSORBIDE MONONITRATE 30 MG/1
30 TABLET, EXTENDED RELEASE ORAL DAILY
Status: DISCONTINUED | OUTPATIENT
Start: 2019-07-27 | End: 2019-08-04 | Stop reason: HOSPADM

## 2019-07-27 RX ORDER — NICOTINE POLACRILEX 4 MG
15 LOZENGE BUCCAL
Status: DISCONTINUED | OUTPATIENT
Start: 2019-07-27 | End: 2019-08-04 | Stop reason: HOSPADM

## 2019-07-27 RX ORDER — ASPIRIN 81 MG/1
81 TABLET ORAL DAILY
Status: DISCONTINUED | OUTPATIENT
Start: 2019-07-27 | End: 2019-08-04 | Stop reason: HOSPADM

## 2019-07-27 RX ADMIN — SODIUM CHLORIDE 3.5 UNITS/HR: 9 INJECTION, SOLUTION INTRAVENOUS at 12:37

## 2019-07-27 RX ADMIN — ATORVASTATIN CALCIUM 20 MG: 20 TABLET, FILM COATED ORAL at 08:55

## 2019-07-27 RX ADMIN — ISOSORBIDE MONONITRATE 30 MG: 30 TABLET, EXTENDED RELEASE ORAL at 08:55

## 2019-07-27 RX ADMIN — SODIUM CHLORIDE 1 UNITS/HR: 9 INJECTION, SOLUTION INTRAVENOUS at 07:30

## 2019-07-27 RX ADMIN — ALLOPURINOL 100 MG: 100 TABLET ORAL at 08:55

## 2019-07-27 RX ADMIN — CETIRIZINE HYDROCHLORIDE 5 MG: 10 TABLET, FILM COATED ORAL at 08:54

## 2019-07-27 RX ADMIN — DIATRIZOATE MEGLUMINE AND DIATRIZOATE SODIUM 15 ML: 660; 100 LIQUID ORAL; RECTAL at 11:18

## 2019-07-27 RX ADMIN — SODIUM CHLORIDE 1 G: 900 INJECTION INTRAVENOUS at 04:21

## 2019-07-27 RX ADMIN — ASPIRIN 81 MG: 81 TABLET, COATED ORAL at 17:05

## 2019-07-27 RX ADMIN — SODIUM CHLORIDE 2.5 UNITS/HR: 9 INJECTION, SOLUTION INTRAVENOUS at 05:33

## 2019-07-27 RX ADMIN — DEXTROSE AND SODIUM CHLORIDE 200 ML/HR: 5; 450 INJECTION, SOLUTION INTRAVENOUS at 00:23

## 2019-07-27 RX ADMIN — INSULIN DETEMIR 14 UNITS: 100 INJECTION, SOLUTION SUBCUTANEOUS at 09:24

## 2019-07-27 RX ADMIN — PANTOPRAZOLE SODIUM 40 MG: 40 TABLET, DELAYED RELEASE ORAL at 10:07

## 2019-07-27 RX ADMIN — INSULIN DETEMIR 14 UNITS: 100 INJECTION, SOLUTION SUBCUTANEOUS at 20:50

## 2019-07-27 RX ADMIN — CARVEDILOL 6.25 MG: 12.5 TABLET, FILM COATED ORAL at 07:34

## 2019-07-27 RX ADMIN — Medication 250 MG: at 08:55

## 2019-07-27 RX ADMIN — BUDESONIDE AND FORMOTEROL FUMARATE DIHYDRATE 2 PUFF: 80; 4.5 AEROSOL RESPIRATORY (INHALATION) at 20:32

## 2019-07-27 RX ADMIN — MEROPENEM 1 G: 1 INJECTION, POWDER, FOR SOLUTION INTRAVENOUS at 10:07

## 2019-07-27 RX ADMIN — ACETAMINOPHEN 650 MG: 325 TABLET, FILM COATED ORAL at 23:37

## 2019-07-27 RX ADMIN — SODIUM CHLORIDE 4.5 UNITS/HR: 9 INJECTION, SOLUTION INTRAVENOUS at 11:21

## 2019-07-27 RX ADMIN — Medication: at 12:11

## 2019-07-27 RX ADMIN — MEROPENEM 1 G: 1 INJECTION, POWDER, FOR SOLUTION INTRAVENOUS at 17:05

## 2019-07-27 RX ADMIN — SODIUM CHLORIDE, PRESERVATIVE FREE 3 ML: 5 INJECTION INTRAVENOUS at 20:53

## 2019-07-27 RX ADMIN — INSULIN LISPRO 7 UNITS: 100 INJECTION, SOLUTION INTRAVENOUS; SUBCUTANEOUS at 17:06

## 2019-07-27 RX ADMIN — FERROUS SULFATE TAB 325 MG (65 MG ELEMENTAL FE) 325 MG: 325 (65 FE) TAB at 07:34

## 2019-07-27 RX ADMIN — DEXTROSE AND SODIUM CHLORIDE 200 ML/HR: 5; 450 INJECTION, SOLUTION INTRAVENOUS at 05:16

## 2019-07-27 RX ADMIN — SODIUM CHLORIDE 125 ML/HR: 4.5 INJECTION, SOLUTION INTRAVENOUS at 14:02

## 2019-07-27 RX ADMIN — CARVEDILOL 6.25 MG: 12.5 TABLET, FILM COATED ORAL at 17:05

## 2019-07-27 RX ADMIN — INSULIN LISPRO 4 UNITS: 100 INJECTION, SOLUTION INTRAVENOUS; SUBCUTANEOUS at 20:50

## 2019-07-28 LAB
ALBUMIN SERPL-MCNC: 2.5 G/DL (ref 3.5–5.2)
ALBUMIN/GLOB SERPL: 0.9 G/DL
ALP SERPL-CCNC: 95 U/L (ref 39–117)
ALT SERPL W P-5'-P-CCNC: 19 U/L (ref 1–33)
ANION GAP SERPL CALCULATED.3IONS-SCNC: 10 MMOL/L (ref 5–15)
ANION GAP SERPL CALCULATED.3IONS-SCNC: 10 MMOL/L (ref 5–15)
ANION GAP SERPL CALCULATED.3IONS-SCNC: 12 MMOL/L (ref 5–15)
AST SERPL-CCNC: 26 U/L (ref 1–32)
BACTERIA SPEC AEROBE CULT: ABNORMAL
BILIRUB SERPL-MCNC: 0.2 MG/DL (ref 0.2–1.2)
BUN BLD-MCNC: 41 MG/DL (ref 8–23)
BUN BLD-MCNC: 42 MG/DL (ref 8–23)
BUN BLD-MCNC: 45 MG/DL (ref 8–23)
BUN/CREAT SERPL: 28.2 (ref 7–25)
BUN/CREAT SERPL: 33.1 (ref 7–25)
BUN/CREAT SERPL: 33.3 (ref 7–25)
CALCIUM SPEC-SCNC: 8.2 MG/DL (ref 8.6–10.5)
CALCIUM SPEC-SCNC: 8.2 MG/DL (ref 8.6–10.5)
CALCIUM SPEC-SCNC: 8.4 MG/DL (ref 8.6–10.5)
CHLORIDE SERPL-SCNC: 102 MMOL/L (ref 98–107)
CHLORIDE SERPL-SCNC: 103 MMOL/L (ref 98–107)
CHLORIDE SERPL-SCNC: 106 MMOL/L (ref 98–107)
CHOLEST SERPL-MCNC: 177 MG/DL (ref 0–200)
CO2 SERPL-SCNC: 23 MMOL/L (ref 22–29)
CO2 SERPL-SCNC: 24 MMOL/L (ref 22–29)
CO2 SERPL-SCNC: 24 MMOL/L (ref 22–29)
CREAT BLD-MCNC: 1.24 MG/DL (ref 0.57–1)
CREAT BLD-MCNC: 1.35 MG/DL (ref 0.57–1)
CREAT BLD-MCNC: 1.49 MG/DL (ref 0.57–1)
DEPRECATED RDW RBC AUTO: 56.4 FL (ref 37–54)
ERYTHROCYTE [DISTWIDTH] IN BLOOD BY AUTOMATED COUNT: 15.9 % (ref 12.3–15.4)
FERRITIN SERPL-MCNC: 453.7 NG/ML (ref 13–150)
FOLATE SERPL-MCNC: 15.3 NG/ML (ref 4.78–24.2)
GFR SERPL CREATININE-BSD FRML MDRD: 43 ML/MIN/1.73
GFR SERPL CREATININE-BSD FRML MDRD: 48 ML/MIN/1.73
GFR SERPL CREATININE-BSD FRML MDRD: 53 ML/MIN/1.73
GLOBULIN UR ELPH-MCNC: 2.8 GM/DL
GLUCOSE BLD-MCNC: 104 MG/DL (ref 65–99)
GLUCOSE BLD-MCNC: 164 MG/DL (ref 65–99)
GLUCOSE BLD-MCNC: 38 MG/DL (ref 65–99)
GLUCOSE BLDC GLUCOMTR-MCNC: 165 MG/DL (ref 70–130)
GLUCOSE BLDC GLUCOMTR-MCNC: 225 MG/DL (ref 70–130)
GLUCOSE BLDC GLUCOMTR-MCNC: 263 MG/DL (ref 70–130)
GLUCOSE BLDC GLUCOMTR-MCNC: 34 MG/DL (ref 70–130)
GLUCOSE BLDC GLUCOMTR-MCNC: 34 MG/DL (ref 70–130)
GLUCOSE BLDC GLUCOMTR-MCNC: 75 MG/DL (ref 70–130)
HCT VFR BLD AUTO: 26.6 % (ref 34–46.6)
HDLC SERPL-MCNC: 65 MG/DL (ref 40–60)
HGB BLD-MCNC: 8.5 G/DL (ref 12–15.9)
INR PPP: 3.47 (ref 0.85–1.16)
IRON 24H UR-MRATE: 20 MCG/DL (ref 37–145)
IRON SATN MFR SERPL: 8 % (ref 20–50)
LDLC SERPL CALC-MCNC: 94 MG/DL (ref 0–100)
LDLC/HDLC SERPL: 1.44 {RATIO}
MAGNESIUM SERPL-MCNC: 2.1 MG/DL (ref 1.6–2.4)
MCH RBC QN AUTO: 31.4 PG (ref 26.6–33)
MCHC RBC AUTO-ENTMCNC: 32 G/DL (ref 31.5–35.7)
MCV RBC AUTO: 98.2 FL (ref 79–97)
PHOSPHATE SERPL-MCNC: 3.2 MG/DL (ref 2.5–4.5)
PLATELET # BLD AUTO: 235 10*3/MM3 (ref 140–450)
PMV BLD AUTO: 11 FL (ref 6–12)
POTASSIUM BLD-SCNC: 3.9 MMOL/L (ref 3.5–5.2)
POTASSIUM BLD-SCNC: 4 MMOL/L (ref 3.5–5.2)
POTASSIUM BLD-SCNC: 4 MMOL/L (ref 3.5–5.2)
PROT SERPL-MCNC: 5.3 G/DL (ref 6–8.5)
PROTHROMBIN TIME: 33.6 SECONDS (ref 11.2–14.3)
RBC # BLD AUTO: 2.71 10*6/MM3 (ref 3.77–5.28)
SODIUM BLD-SCNC: 136 MMOL/L (ref 136–145)
SODIUM BLD-SCNC: 138 MMOL/L (ref 136–145)
SODIUM BLD-SCNC: 140 MMOL/L (ref 136–145)
TIBC SERPL-MCNC: 237 MCG/DL (ref 298–536)
TRANSFERRIN SERPL-MCNC: 159 MG/DL (ref 200–360)
TRIGL SERPL-MCNC: 91 MG/DL (ref 0–150)
VIT B12 BLD-MCNC: 281 PG/ML (ref 211–946)
VLDLC SERPL-MCNC: 18.2 MG/DL
WBC NRBC COR # BLD: 6.43 10*3/MM3 (ref 3.4–10.8)

## 2019-07-28 PROCEDURE — 85610 PROTHROMBIN TIME: CPT | Performed by: FAMILY MEDICINE

## 2019-07-28 PROCEDURE — 25010000002 MEROPENEM: Performed by: INTERNAL MEDICINE

## 2019-07-28 PROCEDURE — 82746 ASSAY OF FOLIC ACID SERUM: CPT | Performed by: INTERNAL MEDICINE

## 2019-07-28 PROCEDURE — 83735 ASSAY OF MAGNESIUM: CPT | Performed by: INTERNAL MEDICINE

## 2019-07-28 PROCEDURE — 94799 UNLISTED PULMONARY SVC/PX: CPT

## 2019-07-28 PROCEDURE — 84100 ASSAY OF PHOSPHORUS: CPT | Performed by: INTERNAL MEDICINE

## 2019-07-28 PROCEDURE — 99233 SBSQ HOSP IP/OBS HIGH 50: CPT | Performed by: INTERNAL MEDICINE

## 2019-07-28 PROCEDURE — 82962 GLUCOSE BLOOD TEST: CPT

## 2019-07-28 PROCEDURE — 25010000002 ONDANSETRON PER 1 MG: Performed by: FAMILY MEDICINE

## 2019-07-28 PROCEDURE — 83540 ASSAY OF IRON: CPT | Performed by: INTERNAL MEDICINE

## 2019-07-28 PROCEDURE — 82607 VITAMIN B-12: CPT | Performed by: INTERNAL MEDICINE

## 2019-07-28 PROCEDURE — 82728 ASSAY OF FERRITIN: CPT | Performed by: INTERNAL MEDICINE

## 2019-07-28 PROCEDURE — 63710000001 INSULIN DETEMIR PER 5 UNITS: Performed by: FAMILY MEDICINE

## 2019-07-28 PROCEDURE — 85027 COMPLETE CBC AUTOMATED: CPT | Performed by: INTERNAL MEDICINE

## 2019-07-28 PROCEDURE — 80053 COMPREHEN METABOLIC PANEL: CPT | Performed by: FAMILY MEDICINE

## 2019-07-28 PROCEDURE — 80061 LIPID PANEL: CPT | Performed by: INTERNAL MEDICINE

## 2019-07-28 PROCEDURE — 84466 ASSAY OF TRANSFERRIN: CPT | Performed by: INTERNAL MEDICINE

## 2019-07-28 RX ADMIN — CARVEDILOL 6.25 MG: 12.5 TABLET, FILM COATED ORAL at 08:39

## 2019-07-28 RX ADMIN — ASPIRIN 81 MG: 81 TABLET, COATED ORAL at 08:39

## 2019-07-28 RX ADMIN — INSULIN LISPRO 7 UNITS: 100 INJECTION, SOLUTION INTRAVENOUS; SUBCUTANEOUS at 17:40

## 2019-07-28 RX ADMIN — BUDESONIDE AND FORMOTEROL FUMARATE DIHYDRATE 2 PUFF: 80; 4.5 AEROSOL RESPIRATORY (INHALATION) at 19:59

## 2019-07-28 RX ADMIN — SODIUM CHLORIDE, PRESERVATIVE FREE 3 ML: 5 INJECTION INTRAVENOUS at 08:46

## 2019-07-28 RX ADMIN — PANTOPRAZOLE SODIUM 40 MG: 40 TABLET, DELAYED RELEASE ORAL at 08:40

## 2019-07-28 RX ADMIN — INSULIN DETEMIR 14 UNITS: 100 INJECTION, SOLUTION SUBCUTANEOUS at 10:25

## 2019-07-28 RX ADMIN — BUDESONIDE AND FORMOTEROL FUMARATE DIHYDRATE 2 PUFF: 80; 4.5 AEROSOL RESPIRATORY (INHALATION) at 09:30

## 2019-07-28 RX ADMIN — Medication 250 MG: at 08:38

## 2019-07-28 RX ADMIN — INSULIN LISPRO 6 UNITS: 100 INJECTION, SOLUTION INTRAVENOUS; SUBCUTANEOUS at 17:40

## 2019-07-28 RX ADMIN — ISOSORBIDE MONONITRATE 30 MG: 30 TABLET, EXTENDED RELEASE ORAL at 08:38

## 2019-07-28 RX ADMIN — INSULIN LISPRO 4 UNITS: 100 INJECTION, SOLUTION INTRAVENOUS; SUBCUTANEOUS at 12:58

## 2019-07-28 RX ADMIN — INSULIN LISPRO 2 UNITS: 100 INJECTION, SOLUTION INTRAVENOUS; SUBCUTANEOUS at 21:35

## 2019-07-28 RX ADMIN — Medication 15 G: at 08:18

## 2019-07-28 RX ADMIN — SODIUM CHLORIDE 80 ML/HR: 4.5 INJECTION, SOLUTION INTRAVENOUS at 02:42

## 2019-07-28 RX ADMIN — LEVOTHYROXINE SODIUM 150 MCG: 150 TABLET ORAL at 06:01

## 2019-07-28 RX ADMIN — MEROPENEM 1 G: 1 INJECTION, POWDER, FOR SOLUTION INTRAVENOUS at 17:39

## 2019-07-28 RX ADMIN — FERROUS SULFATE TAB 325 MG (65 MG ELEMENTAL FE) 325 MG: 325 (65 FE) TAB at 08:49

## 2019-07-28 RX ADMIN — CARVEDILOL 6.25 MG: 12.5 TABLET, FILM COATED ORAL at 17:40

## 2019-07-28 RX ADMIN — ONDANSETRON 4 MG: 2 INJECTION INTRAMUSCULAR; INTRAVENOUS at 08:20

## 2019-07-28 RX ADMIN — CETIRIZINE HYDROCHLORIDE 5 MG: 10 TABLET, FILM COATED ORAL at 08:39

## 2019-07-28 RX ADMIN — ALLOPURINOL 100 MG: 100 TABLET ORAL at 08:40

## 2019-07-28 RX ADMIN — MEROPENEM 1 G: 1 INJECTION, POWDER, FOR SOLUTION INTRAVENOUS at 06:01

## 2019-07-28 RX ADMIN — ACETAMINOPHEN 650 MG: 325 TABLET, FILM COATED ORAL at 17:39

## 2019-07-28 RX ADMIN — ATORVASTATIN CALCIUM 20 MG: 20 TABLET, FILM COATED ORAL at 08:39

## 2019-07-28 RX ADMIN — INSULIN DETEMIR 14 UNITS: 100 INJECTION, SOLUTION SUBCUTANEOUS at 21:36

## 2019-07-29 ENCOUNTER — APPOINTMENT (OUTPATIENT)
Dept: GENERAL RADIOLOGY | Facility: HOSPITAL | Age: 64
End: 2019-07-29

## 2019-07-29 ENCOUNTER — APPOINTMENT (OUTPATIENT)
Dept: CARDIOLOGY | Facility: HOSPITAL | Age: 64
End: 2019-07-29

## 2019-07-29 LAB
ALBUMIN SERPL-MCNC: 2.6 G/DL (ref 3.5–5.2)
ALBUMIN/GLOB SERPL: 0.9 G/DL
ALP SERPL-CCNC: 105 U/L (ref 39–117)
ALT SERPL W P-5'-P-CCNC: 19 U/L (ref 1–33)
ANION GAP SERPL CALCULATED.3IONS-SCNC: 11 MMOL/L (ref 5–15)
AST SERPL-CCNC: 26 U/L (ref 1–32)
BACTERIA SPEC AEROBE CULT: ABNORMAL
BACTERIA SPEC AEROBE CULT: ABNORMAL
BASOPHILS # BLD AUTO: 0.02 10*3/MM3 (ref 0–0.2)
BASOPHILS NFR BLD AUTO: 0.4 % (ref 0–1.5)
BILIRUB SERPL-MCNC: 0.2 MG/DL (ref 0.2–1.2)
BUN BLD-MCNC: 40 MG/DL (ref 8–23)
BUN/CREAT SERPL: 27.4 (ref 7–25)
CALCIUM SPEC-SCNC: 8.5 MG/DL (ref 8.6–10.5)
CHLORIDE SERPL-SCNC: 103 MMOL/L (ref 98–107)
CO2 SERPL-SCNC: 23 MMOL/L (ref 22–29)
CREAT BLD-MCNC: 1.46 MG/DL (ref 0.57–1)
D-LACTATE SERPL-SCNC: 1.7 MMOL/L (ref 0.5–2)
DEPRECATED RDW RBC AUTO: 57.4 FL (ref 37–54)
EOSINOPHIL # BLD AUTO: 0.2 10*3/MM3 (ref 0–0.4)
EOSINOPHIL NFR BLD AUTO: 4 % (ref 0.3–6.2)
ERYTHROCYTE [DISTWIDTH] IN BLOOD BY AUTOMATED COUNT: 15.5 % (ref 12.3–15.4)
GFR SERPL CREATININE-BSD FRML MDRD: 44 ML/MIN/1.73
GLOBULIN UR ELPH-MCNC: 3 GM/DL
GLUCOSE BLD-MCNC: 158 MG/DL (ref 65–99)
GLUCOSE BLDC GLUCOMTR-MCNC: 130 MG/DL (ref 70–130)
GLUCOSE BLDC GLUCOMTR-MCNC: 177 MG/DL (ref 70–130)
GLUCOSE BLDC GLUCOMTR-MCNC: 180 MG/DL (ref 70–130)
GLUCOSE BLDC GLUCOMTR-MCNC: 184 MG/DL (ref 70–130)
GLUCOSE BLDC GLUCOMTR-MCNC: 75 MG/DL (ref 70–130)
GRAM STN SPEC: ABNORMAL
HCT VFR BLD AUTO: 30.2 % (ref 34–46.6)
HGB BLD-MCNC: 9.4 G/DL (ref 12–15.9)
IMM GRANULOCYTES # BLD AUTO: 0.02 10*3/MM3 (ref 0–0.05)
IMM GRANULOCYTES NFR BLD AUTO: 0.4 % (ref 0–0.5)
INR PPP: 2.11 (ref 0.85–1.16)
LYMPHOCYTES # BLD AUTO: 0.36 10*3/MM3 (ref 0.7–3.1)
LYMPHOCYTES NFR BLD AUTO: 7.3 % (ref 19.6–45.3)
MCH RBC QN AUTO: 31.2 PG (ref 26.6–33)
MCHC RBC AUTO-ENTMCNC: 31.1 G/DL (ref 31.5–35.7)
MCV RBC AUTO: 100.3 FL (ref 79–97)
MONOCYTES # BLD AUTO: 0.54 10*3/MM3 (ref 0.1–0.9)
MONOCYTES NFR BLD AUTO: 10.9 % (ref 5–12)
NEUTROPHILS # BLD AUTO: 3.81 10*3/MM3 (ref 1.7–7)
NEUTROPHILS NFR BLD AUTO: 77 % (ref 42.7–76)
NRBC BLD AUTO-RTO: 0 /100 WBC (ref 0–0.2)
PLATELET # BLD AUTO: 236 10*3/MM3 (ref 140–450)
PMV BLD AUTO: 10.7 FL (ref 6–12)
POTASSIUM BLD-SCNC: 4.7 MMOL/L (ref 3.5–5.2)
PROT SERPL-MCNC: 5.6 G/DL (ref 6–8.5)
PROTHROMBIN TIME: 22.8 SECONDS (ref 11.2–14.3)
RBC # BLD AUTO: 3.01 10*6/MM3 (ref 3.77–5.28)
SODIUM BLD-SCNC: 137 MMOL/L (ref 136–145)
TROPONIN T SERPL-MCNC: 0.02 NG/ML (ref 0–0.03)
WBC NRBC COR # BLD: 4.95 10*3/MM3 (ref 3.4–10.8)

## 2019-07-29 PROCEDURE — 93970 EXTREMITY STUDY: CPT

## 2019-07-29 PROCEDURE — 25010000002 ONDANSETRON PER 1 MG: Performed by: FAMILY MEDICINE

## 2019-07-29 PROCEDURE — 25010000002 MEROPENEM: Performed by: INTERNAL MEDICINE

## 2019-07-29 PROCEDURE — 82962 GLUCOSE BLOOD TEST: CPT

## 2019-07-29 PROCEDURE — 80053 COMPREHEN METABOLIC PANEL: CPT | Performed by: NURSE PRACTITIONER

## 2019-07-29 PROCEDURE — 85610 PROTHROMBIN TIME: CPT | Performed by: FAMILY MEDICINE

## 2019-07-29 PROCEDURE — 25010000002 PIPERACILLIN SOD-TAZOBACTAM PER 1 G: Performed by: INTERNAL MEDICINE

## 2019-07-29 PROCEDURE — 85025 COMPLETE CBC W/AUTO DIFF WBC: CPT | Performed by: NURSE PRACTITIONER

## 2019-07-29 PROCEDURE — 93970 EXTREMITY STUDY: CPT | Performed by: INTERNAL MEDICINE

## 2019-07-29 PROCEDURE — 99233 SBSQ HOSP IP/OBS HIGH 50: CPT | Performed by: INTERNAL MEDICINE

## 2019-07-29 PROCEDURE — 84484 ASSAY OF TROPONIN QUANT: CPT | Performed by: NURSE PRACTITIONER

## 2019-07-29 PROCEDURE — 94799 UNLISTED PULMONARY SVC/PX: CPT

## 2019-07-29 PROCEDURE — 83605 ASSAY OF LACTIC ACID: CPT | Performed by: NURSE PRACTITIONER

## 2019-07-29 PROCEDURE — 63710000001 INSULIN DETEMIR PER 5 UNITS: Performed by: INTERNAL MEDICINE

## 2019-07-29 PROCEDURE — 74018 RADEX ABDOMEN 1 VIEW: CPT

## 2019-07-29 PROCEDURE — G0108 DIAB MANAGE TRN  PER INDIV: HCPCS | Performed by: REGISTERED NURSE

## 2019-07-29 RX ORDER — MORPHINE SULFATE 2 MG/ML
2 INJECTION, SOLUTION INTRAMUSCULAR; INTRAVENOUS ONCE
Status: DISCONTINUED | OUTPATIENT
Start: 2019-07-29 | End: 2019-07-29

## 2019-07-29 RX ORDER — WARFARIN SODIUM 7.5 MG/1
7.5 TABLET ORAL
Status: COMPLETED | OUTPATIENT
Start: 2019-07-29 | End: 2019-07-29

## 2019-07-29 RX ORDER — WARFARIN SODIUM 7.5 MG/1
7.5 TABLET ORAL
Status: DISCONTINUED | OUTPATIENT
Start: 2019-07-29 | End: 2019-07-31

## 2019-07-29 RX ORDER — CARVEDILOL 6.25 MG/1
6.25 TABLET ORAL ONCE
Status: COMPLETED | OUTPATIENT
Start: 2019-07-29 | End: 2019-07-29

## 2019-07-29 RX ADMIN — BUDESONIDE AND FORMOTEROL FUMARATE DIHYDRATE 2 PUFF: 80; 4.5 AEROSOL RESPIRATORY (INHALATION) at 09:29

## 2019-07-29 RX ADMIN — CARVEDILOL 6.25 MG: 12.5 TABLET, FILM COATED ORAL at 08:51

## 2019-07-29 RX ADMIN — INSULIN LISPRO 2 UNITS: 100 INJECTION, SOLUTION INTRAVENOUS; SUBCUTANEOUS at 17:58

## 2019-07-29 RX ADMIN — INSULIN DETEMIR 10 UNITS: 100 INJECTION, SOLUTION SUBCUTANEOUS at 21:44

## 2019-07-29 RX ADMIN — INSULIN LISPRO 2 UNITS: 100 INJECTION, SOLUTION INTRAVENOUS; SUBCUTANEOUS at 21:41

## 2019-07-29 RX ADMIN — ATORVASTATIN CALCIUM 20 MG: 20 TABLET, FILM COATED ORAL at 08:51

## 2019-07-29 RX ADMIN — WARFARIN SODIUM 7.5 MG: 7.5 TABLET ORAL at 21:44

## 2019-07-29 RX ADMIN — BUDESONIDE AND FORMOTEROL FUMARATE DIHYDRATE 2 PUFF: 80; 4.5 AEROSOL RESPIRATORY (INHALATION) at 20:36

## 2019-07-29 RX ADMIN — CETIRIZINE HYDROCHLORIDE 5 MG: 10 TABLET, FILM COATED ORAL at 08:51

## 2019-07-29 RX ADMIN — ONDANSETRON 4 MG: 2 INJECTION INTRAMUSCULAR; INTRAVENOUS at 16:36

## 2019-07-29 RX ADMIN — ISOSORBIDE MONONITRATE 30 MG: 30 TABLET, EXTENDED RELEASE ORAL at 08:51

## 2019-07-29 RX ADMIN — INSULIN DETEMIR 10 UNITS: 100 INJECTION, SOLUTION SUBCUTANEOUS at 08:50

## 2019-07-29 RX ADMIN — ASPIRIN 81 MG: 81 TABLET, COATED ORAL at 08:51

## 2019-07-29 RX ADMIN — INSULIN LISPRO 7 UNITS: 100 INJECTION, SOLUTION INTRAVENOUS; SUBCUTANEOUS at 17:58

## 2019-07-29 RX ADMIN — FERROUS SULFATE TAB 325 MG (65 MG ELEMENTAL FE) 325 MG: 325 (65 FE) TAB at 08:51

## 2019-07-29 RX ADMIN — MEROPENEM 1 G: 1 INJECTION, POWDER, FOR SOLUTION INTRAVENOUS at 05:26

## 2019-07-29 RX ADMIN — TAZOBACTAM SODIUM AND PIPERACILLIN SODIUM 3.38 G: 375; 3 INJECTION, SOLUTION INTRAVENOUS at 21:43

## 2019-07-29 RX ADMIN — TAZOBACTAM SODIUM AND PIPERACILLIN SODIUM 3.38 G: 375; 3 INJECTION, SOLUTION INTRAVENOUS at 13:52

## 2019-07-29 RX ADMIN — Medication 250 MG: at 08:51

## 2019-07-29 RX ADMIN — INSULIN LISPRO 7 UNITS: 100 INJECTION, SOLUTION INTRAVENOUS; SUBCUTANEOUS at 08:51

## 2019-07-29 RX ADMIN — INSULIN LISPRO 2 UNITS: 100 INJECTION, SOLUTION INTRAVENOUS; SUBCUTANEOUS at 11:48

## 2019-07-29 RX ADMIN — PANTOPRAZOLE SODIUM 40 MG: 40 TABLET, DELAYED RELEASE ORAL at 08:51

## 2019-07-29 RX ADMIN — TAZOBACTAM SODIUM AND PIPERACILLIN SODIUM 3.38 G: 375; 3 INJECTION, SOLUTION INTRAVENOUS at 08:50

## 2019-07-29 RX ADMIN — INSULIN LISPRO 7 UNITS: 100 INJECTION, SOLUTION INTRAVENOUS; SUBCUTANEOUS at 11:48

## 2019-07-29 RX ADMIN — ONDANSETRON 4 MG: 2 INJECTION INTRAMUSCULAR; INTRAVENOUS at 08:59

## 2019-07-29 RX ADMIN — ALLOPURINOL 100 MG: 100 TABLET ORAL at 08:51

## 2019-07-29 RX ADMIN — CARVEDILOL 6.25 MG: 6.25 TABLET, FILM COATED ORAL at 21:42

## 2019-07-29 RX ADMIN — LEVOTHYROXINE SODIUM 150 MCG: 150 TABLET ORAL at 05:26

## 2019-07-29 RX ADMIN — SODIUM CHLORIDE 50 ML/HR: 4.5 INJECTION, SOLUTION INTRAVENOUS at 05:26

## 2019-07-30 LAB
ALBUMIN SERPL-MCNC: 2.2 G/DL (ref 3.5–5.2)
ALBUMIN/GLOB SERPL: 0.8 G/DL
ALP SERPL-CCNC: 87 U/L (ref 39–117)
ALT SERPL W P-5'-P-CCNC: 16 U/L (ref 1–33)
ANION GAP SERPL CALCULATED.3IONS-SCNC: 8 MMOL/L (ref 5–15)
AST SERPL-CCNC: 24 U/L (ref 1–32)
BH CV LOWER VASCULAR LEFT COMMON FEMORAL AUGMENT: NORMAL
BH CV LOWER VASCULAR LEFT COMMON FEMORAL COMPRESS: NORMAL
BH CV LOWER VASCULAR LEFT COMMON FEMORAL PHASIC: NORMAL
BH CV LOWER VASCULAR LEFT COMMON FEMORAL SPONT: NORMAL
BH CV LOWER VASCULAR LEFT DISTAL FEMORAL AUGMENT: NORMAL
BH CV LOWER VASCULAR LEFT DISTAL FEMORAL COMPRESS: NORMAL
BH CV LOWER VASCULAR LEFT DISTAL FEMORAL PHASIC: NORMAL
BH CV LOWER VASCULAR LEFT DISTAL FEMORAL SPONT: NORMAL
BH CV LOWER VASCULAR LEFT GASTRONEMIUS COMPRESS: NORMAL
BH CV LOWER VASCULAR LEFT GREATER SAPH AK COMPRESS: NORMAL
BH CV LOWER VASCULAR LEFT GREATER SAPH BK COMPRESS: NORMAL
BH CV LOWER VASCULAR LEFT LESSER SAPH COMPRESS: NORMAL
BH CV LOWER VASCULAR LEFT MID FEMORAL AUGMENT: NORMAL
BH CV LOWER VASCULAR LEFT MID FEMORAL COMPRESS: NORMAL
BH CV LOWER VASCULAR LEFT MID FEMORAL PHASIC: NORMAL
BH CV LOWER VASCULAR LEFT MID FEMORAL SPONT: NORMAL
BH CV LOWER VASCULAR LEFT PERONEAL COMPRESS: NORMAL
BH CV LOWER VASCULAR LEFT POPLITEAL AUGMENT: NORMAL
BH CV LOWER VASCULAR LEFT POPLITEAL COMPRESS: NORMAL
BH CV LOWER VASCULAR LEFT POPLITEAL PHASIC: NORMAL
BH CV LOWER VASCULAR LEFT POPLITEAL SPONT: NORMAL
BH CV LOWER VASCULAR LEFT POSTERIOR TIBIAL COMPRESS: NORMAL
BH CV LOWER VASCULAR LEFT PROFUNDA FEMORAL AUGMENT: NORMAL
BH CV LOWER VASCULAR LEFT PROFUNDA FEMORAL COMPRESS: NORMAL
BH CV LOWER VASCULAR LEFT PROFUNDA FEMORAL PHASIC: NORMAL
BH CV LOWER VASCULAR LEFT PROFUNDA FEMORAL SPONT: NORMAL
BH CV LOWER VASCULAR LEFT PROXIMAL FEMORAL AUGMENT: NORMAL
BH CV LOWER VASCULAR LEFT PROXIMAL FEMORAL COMPRESS: NORMAL
BH CV LOWER VASCULAR LEFT PROXIMAL FEMORAL PHASIC: NORMAL
BH CV LOWER VASCULAR LEFT PROXIMAL FEMORAL SPONT: NORMAL
BH CV LOWER VASCULAR LEFT SAPHENOFEMORAL JUNCTION COMPRESS: NORMAL
BH CV LOWER VASCULAR LEFT SAPHENOFEMORAL JUNCTION SPONT: NORMAL
BH CV LOWER VASCULAR RIGHT COMMON FEMORAL AUGMENT: NORMAL
BH CV LOWER VASCULAR RIGHT COMMON FEMORAL COMPRESS: NORMAL
BH CV LOWER VASCULAR RIGHT COMMON FEMORAL PHASIC: NORMAL
BH CV LOWER VASCULAR RIGHT COMMON FEMORAL SPONT: NORMAL
BH CV LOWER VASCULAR RIGHT DISTAL FEMORAL AUGMENT: NORMAL
BH CV LOWER VASCULAR RIGHT DISTAL FEMORAL COMPRESS: NORMAL
BH CV LOWER VASCULAR RIGHT DISTAL FEMORAL PHASIC: NORMAL
BH CV LOWER VASCULAR RIGHT DISTAL FEMORAL SPONT: NORMAL
BH CV LOWER VASCULAR RIGHT GASTRONEMIUS COMPRESS: NORMAL
BH CV LOWER VASCULAR RIGHT GREATER SAPH AK COMPRESS: NORMAL
BH CV LOWER VASCULAR RIGHT GREATER SAPH BK COMPRESS: NORMAL
BH CV LOWER VASCULAR RIGHT LESSER SAPH COMPRESS: NORMAL
BH CV LOWER VASCULAR RIGHT MID FEMORAL AUGMENT: NORMAL
BH CV LOWER VASCULAR RIGHT MID FEMORAL COMPRESS: NORMAL
BH CV LOWER VASCULAR RIGHT MID FEMORAL PHASIC: NORMAL
BH CV LOWER VASCULAR RIGHT MID FEMORAL SPONT: NORMAL
BH CV LOWER VASCULAR RIGHT PERONEAL COMPRESS: NORMAL
BH CV LOWER VASCULAR RIGHT POPLITEAL AUGMENT: NORMAL
BH CV LOWER VASCULAR RIGHT POPLITEAL COMPRESS: NORMAL
BH CV LOWER VASCULAR RIGHT POPLITEAL PHASIC: NORMAL
BH CV LOWER VASCULAR RIGHT POPLITEAL SPONT: NORMAL
BH CV LOWER VASCULAR RIGHT POSTERIOR TIBIAL COMPRESS: NORMAL
BH CV LOWER VASCULAR RIGHT PROFUNDA FEMORAL AUGMENT: NORMAL
BH CV LOWER VASCULAR RIGHT PROFUNDA FEMORAL COMPRESS: NORMAL
BH CV LOWER VASCULAR RIGHT PROFUNDA FEMORAL PHASIC: NORMAL
BH CV LOWER VASCULAR RIGHT PROFUNDA FEMORAL SPONT: NORMAL
BH CV LOWER VASCULAR RIGHT PROXIMAL FEMORAL AUGMENT: NORMAL
BH CV LOWER VASCULAR RIGHT PROXIMAL FEMORAL COMPRESS: NORMAL
BH CV LOWER VASCULAR RIGHT PROXIMAL FEMORAL PHASIC: NORMAL
BH CV LOWER VASCULAR RIGHT PROXIMAL FEMORAL SPONT: NORMAL
BH CV LOWER VASCULAR RIGHT SAPHENOFEMORAL JUNCTION COMPRESS: NORMAL
BH CV LOWER VASCULAR RIGHT SAPHENOFEMORAL JUNCTION SPONT: NORMAL
BILIRUB SERPL-MCNC: 0.2 MG/DL (ref 0.2–1.2)
BUN BLD-MCNC: 43 MG/DL (ref 8–23)
BUN/CREAT SERPL: 32.1 (ref 7–25)
CALCIUM SPEC-SCNC: 8.3 MG/DL (ref 8.6–10.5)
CHLORIDE SERPL-SCNC: 105 MMOL/L (ref 98–107)
CO2 SERPL-SCNC: 21 MMOL/L (ref 22–29)
CREAT BLD-MCNC: 1.34 MG/DL (ref 0.57–1)
DEPRECATED RDW RBC AUTO: 59.7 FL (ref 37–54)
ERYTHROCYTE [DISTWIDTH] IN BLOOD BY AUTOMATED COUNT: 15.5 % (ref 12.3–15.4)
GFR SERPL CREATININE-BSD FRML MDRD: 48 ML/MIN/1.73
GLOBULIN UR ELPH-MCNC: 2.9 GM/DL
GLUCOSE BLD-MCNC: 199 MG/DL (ref 65–99)
GLUCOSE BLDC GLUCOMTR-MCNC: 148 MG/DL (ref 70–130)
GLUCOSE BLDC GLUCOMTR-MCNC: 167 MG/DL (ref 70–130)
GLUCOSE BLDC GLUCOMTR-MCNC: 198 MG/DL (ref 70–130)
GLUCOSE BLDC GLUCOMTR-MCNC: 316 MG/DL (ref 70–130)
GLUCOSE BLDC GLUCOMTR-MCNC: 336 MG/DL (ref 70–130)
HCT VFR BLD AUTO: 27.2 % (ref 34–46.6)
HGB BLD-MCNC: 8.3 G/DL (ref 12–15.9)
INR PPP: 1.31 (ref 0.85–1.16)
MCH RBC QN AUTO: 31.7 PG (ref 26.6–33)
MCHC RBC AUTO-ENTMCNC: 30.5 G/DL (ref 31.5–35.7)
MCV RBC AUTO: 103.8 FL (ref 79–97)
PLATELET # BLD AUTO: 145 10*3/MM3 (ref 140–450)
PMV BLD AUTO: 11.5 FL (ref 6–12)
POTASSIUM BLD-SCNC: 4.7 MMOL/L (ref 3.5–5.2)
PROT SERPL-MCNC: 5.1 G/DL (ref 6–8.5)
PROTHROMBIN TIME: 15.7 SECONDS (ref 11.2–14.3)
RBC # BLD AUTO: 2.62 10*6/MM3 (ref 3.77–5.28)
SODIUM BLD-SCNC: 134 MMOL/L (ref 136–145)
WBC NRBC COR # BLD: 3.92 10*3/MM3 (ref 3.4–10.8)

## 2019-07-30 PROCEDURE — 25010000002 ENOXAPARIN PER 10 MG: Performed by: INTERNAL MEDICINE

## 2019-07-30 PROCEDURE — 63710000001 INSULIN DETEMIR PER 5 UNITS: Performed by: INTERNAL MEDICINE

## 2019-07-30 PROCEDURE — 85610 PROTHROMBIN TIME: CPT | Performed by: FAMILY MEDICINE

## 2019-07-30 PROCEDURE — 25010000002 PIPERACILLIN SOD-TAZOBACTAM PER 1 G: Performed by: INTERNAL MEDICINE

## 2019-07-30 PROCEDURE — 94799 UNLISTED PULMONARY SVC/PX: CPT

## 2019-07-30 PROCEDURE — 85027 COMPLETE CBC AUTOMATED: CPT | Performed by: INTERNAL MEDICINE

## 2019-07-30 PROCEDURE — 99233 SBSQ HOSP IP/OBS HIGH 50: CPT | Performed by: INTERNAL MEDICINE

## 2019-07-30 PROCEDURE — 82962 GLUCOSE BLOOD TEST: CPT

## 2019-07-30 PROCEDURE — 80053 COMPREHEN METABOLIC PANEL: CPT | Performed by: INTERNAL MEDICINE

## 2019-07-30 RX ORDER — LOPERAMIDE HYDROCHLORIDE 2 MG/1
2 CAPSULE ORAL 4 TIMES DAILY PRN
Status: DISCONTINUED | OUTPATIENT
Start: 2019-07-30 | End: 2019-08-04 | Stop reason: HOSPADM

## 2019-07-30 RX ADMIN — PANTOPRAZOLE SODIUM 40 MG: 40 TABLET, DELAYED RELEASE ORAL at 08:31

## 2019-07-30 RX ADMIN — INSULIN LISPRO 7 UNITS: 100 INJECTION, SOLUTION INTRAVENOUS; SUBCUTANEOUS at 12:05

## 2019-07-30 RX ADMIN — INSULIN DETEMIR 10 UNITS: 100 INJECTION, SOLUTION SUBCUTANEOUS at 08:29

## 2019-07-30 RX ADMIN — ENOXAPARIN SODIUM 70 MG: 80 INJECTION SUBCUTANEOUS at 08:29

## 2019-07-30 RX ADMIN — INSULIN LISPRO 2 UNITS: 100 INJECTION, SOLUTION INTRAVENOUS; SUBCUTANEOUS at 08:30

## 2019-07-30 RX ADMIN — INSULIN LISPRO 7 UNITS: 100 INJECTION, SOLUTION INTRAVENOUS; SUBCUTANEOUS at 08:30

## 2019-07-30 RX ADMIN — ENOXAPARIN SODIUM 70 MG: 80 INJECTION SUBCUTANEOUS at 20:45

## 2019-07-30 RX ADMIN — SODIUM CHLORIDE, PRESERVATIVE FREE 3 ML: 5 INJECTION INTRAVENOUS at 20:52

## 2019-07-30 RX ADMIN — ISOSORBIDE MONONITRATE 30 MG: 30 TABLET, EXTENDED RELEASE ORAL at 08:30

## 2019-07-30 RX ADMIN — BUDESONIDE AND FORMOTEROL FUMARATE DIHYDRATE 2 PUFF: 80; 4.5 AEROSOL RESPIRATORY (INHALATION) at 20:37

## 2019-07-30 RX ADMIN — LEVOTHYROXINE SODIUM 150 MCG: 150 TABLET ORAL at 06:17

## 2019-07-30 RX ADMIN — TAZOBACTAM SODIUM AND PIPERACILLIN SODIUM 3.38 G: 375; 3 INJECTION, SOLUTION INTRAVENOUS at 22:23

## 2019-07-30 RX ADMIN — ALLOPURINOL 100 MG: 100 TABLET ORAL at 08:30

## 2019-07-30 RX ADMIN — INSULIN LISPRO 7 UNITS: 100 INJECTION, SOLUTION INTRAVENOUS; SUBCUTANEOUS at 17:22

## 2019-07-30 RX ADMIN — ATORVASTATIN CALCIUM 20 MG: 20 TABLET, FILM COATED ORAL at 08:30

## 2019-07-30 RX ADMIN — WARFARIN SODIUM 7.5 MG: 7.5 TABLET ORAL at 17:22

## 2019-07-30 RX ADMIN — TAZOBACTAM SODIUM AND PIPERACILLIN SODIUM 3.38 G: 375; 3 INJECTION, SOLUTION INTRAVENOUS at 15:01

## 2019-07-30 RX ADMIN — LOPERAMIDE HYDROCHLORIDE 2 MG: 2 CAPSULE ORAL at 15:38

## 2019-07-30 RX ADMIN — Medication 250 MG: at 08:29

## 2019-07-30 RX ADMIN — BUDESONIDE AND FORMOTEROL FUMARATE DIHYDRATE 2 PUFF: 80; 4.5 AEROSOL RESPIRATORY (INHALATION) at 09:21

## 2019-07-30 RX ADMIN — INSULIN DETEMIR 10 UNITS: 100 INJECTION, SOLUTION SUBCUTANEOUS at 20:45

## 2019-07-30 RX ADMIN — TAZOBACTAM SODIUM AND PIPERACILLIN SODIUM 3.38 G: 375; 3 INJECTION, SOLUTION INTRAVENOUS at 06:17

## 2019-07-30 RX ADMIN — FERROUS SULFATE TAB 325 MG (65 MG ELEMENTAL FE) 325 MG: 325 (65 FE) TAB at 08:30

## 2019-07-30 RX ADMIN — ASPIRIN 81 MG: 81 TABLET, COATED ORAL at 08:30

## 2019-07-31 ENCOUNTER — TRANSCRIBE ORDERS (OUTPATIENT)
Dept: DIABETES SERVICES | Facility: HOSPITAL | Age: 64
End: 2019-07-31

## 2019-07-31 DIAGNOSIS — E11.8 TYPE 2 DIABETES MELLITUS WITH COMPLICATION, UNSPECIFIED WHETHER LONG TERM INSULIN USE: Primary | ICD-10-CM

## 2019-07-31 LAB
GLUCOSE BLDC GLUCOMTR-MCNC: 116 MG/DL (ref 70–130)
GLUCOSE BLDC GLUCOMTR-MCNC: 127 MG/DL (ref 70–130)
GLUCOSE BLDC GLUCOMTR-MCNC: 151 MG/DL (ref 70–130)
GLUCOSE BLDC GLUCOMTR-MCNC: 172 MG/DL (ref 70–130)
INR PPP: 1.76 (ref 0.85–1.16)
PROTHROMBIN TIME: 19.7 SECONDS (ref 11.2–14.3)

## 2019-07-31 PROCEDURE — 82962 GLUCOSE BLOOD TEST: CPT

## 2019-07-31 PROCEDURE — 99232 SBSQ HOSP IP/OBS MODERATE 35: CPT | Performed by: INTERNAL MEDICINE

## 2019-07-31 PROCEDURE — 94799 UNLISTED PULMONARY SVC/PX: CPT

## 2019-07-31 PROCEDURE — 25010000002 PIPERACILLIN SOD-TAZOBACTAM PER 1 G: Performed by: INTERNAL MEDICINE

## 2019-07-31 PROCEDURE — 85610 PROTHROMBIN TIME: CPT | Performed by: FAMILY MEDICINE

## 2019-07-31 PROCEDURE — 63710000001 INSULIN DETEMIR PER 5 UNITS: Performed by: INTERNAL MEDICINE

## 2019-07-31 PROCEDURE — 25010000002 ENOXAPARIN PER 10 MG: Performed by: INTERNAL MEDICINE

## 2019-07-31 RX ORDER — WARFARIN SODIUM 7.5 MG/1
7.5 TABLET ORAL
Status: DISCONTINUED | OUTPATIENT
Start: 2019-08-01 | End: 2019-08-04 | Stop reason: HOSPADM

## 2019-07-31 RX ORDER — WARFARIN SODIUM 7.5 MG/1
3.75 TABLET ORAL
Status: DISCONTINUED | OUTPATIENT
Start: 2019-07-31 | End: 2019-08-04 | Stop reason: HOSPADM

## 2019-07-31 RX ADMIN — LOPERAMIDE HYDROCHLORIDE 2 MG: 2 CAPSULE ORAL at 14:12

## 2019-07-31 RX ADMIN — ALLOPURINOL 100 MG: 100 TABLET ORAL at 09:21

## 2019-07-31 RX ADMIN — INSULIN LISPRO 2 UNITS: 100 INJECTION, SOLUTION INTRAVENOUS; SUBCUTANEOUS at 09:24

## 2019-07-31 RX ADMIN — TAZOBACTAM SODIUM AND PIPERACILLIN SODIUM 3.38 G: 375; 3 INJECTION, SOLUTION INTRAVENOUS at 23:29

## 2019-07-31 RX ADMIN — TAZOBACTAM SODIUM AND PIPERACILLIN SODIUM 3.38 G: 375; 3 INJECTION, SOLUTION INTRAVENOUS at 05:19

## 2019-07-31 RX ADMIN — INSULIN DETEMIR 10 UNITS: 100 INJECTION, SOLUTION SUBCUTANEOUS at 21:29

## 2019-07-31 RX ADMIN — ATORVASTATIN CALCIUM 20 MG: 20 TABLET, FILM COATED ORAL at 09:22

## 2019-07-31 RX ADMIN — ASPIRIN 81 MG: 81 TABLET, COATED ORAL at 09:21

## 2019-07-31 RX ADMIN — BUDESONIDE AND FORMOTEROL FUMARATE DIHYDRATE 2 PUFF: 80; 4.5 AEROSOL RESPIRATORY (INHALATION) at 20:43

## 2019-07-31 RX ADMIN — INSULIN LISPRO 7 UNITS: 100 INJECTION, SOLUTION INTRAVENOUS; SUBCUTANEOUS at 09:23

## 2019-07-31 RX ADMIN — FERROUS SULFATE TAB 325 MG (65 MG ELEMENTAL FE) 325 MG: 325 (65 FE) TAB at 09:21

## 2019-07-31 RX ADMIN — INSULIN LISPRO 7 UNITS: 100 INJECTION, SOLUTION INTRAVENOUS; SUBCUTANEOUS at 17:44

## 2019-07-31 RX ADMIN — INSULIN LISPRO 2 UNITS: 100 INJECTION, SOLUTION INTRAVENOUS; SUBCUTANEOUS at 12:31

## 2019-07-31 RX ADMIN — INSULIN LISPRO 7 UNITS: 100 INJECTION, SOLUTION INTRAVENOUS; SUBCUTANEOUS at 12:31

## 2019-07-31 RX ADMIN — ENOXAPARIN SODIUM 70 MG: 80 INJECTION SUBCUTANEOUS at 09:22

## 2019-07-31 RX ADMIN — BUDESONIDE AND FORMOTEROL FUMARATE DIHYDRATE 2 PUFF: 80; 4.5 AEROSOL RESPIRATORY (INHALATION) at 08:26

## 2019-07-31 RX ADMIN — WARFARIN SODIUM 3.75 MG: 7.5 TABLET ORAL at 17:45

## 2019-07-31 RX ADMIN — CETIRIZINE HYDROCHLORIDE 5 MG: 10 TABLET, FILM COATED ORAL at 09:21

## 2019-07-31 RX ADMIN — CARVEDILOL 6.25 MG: 12.5 TABLET, FILM COATED ORAL at 09:20

## 2019-07-31 RX ADMIN — LEVOTHYROXINE SODIUM 150 MCG: 150 TABLET ORAL at 05:19

## 2019-07-31 RX ADMIN — PANTOPRAZOLE SODIUM 40 MG: 40 TABLET, DELAYED RELEASE ORAL at 09:20

## 2019-07-31 RX ADMIN — ISOSORBIDE MONONITRATE 30 MG: 30 TABLET, EXTENDED RELEASE ORAL at 09:21

## 2019-07-31 RX ADMIN — ENOXAPARIN SODIUM 70 MG: 80 INJECTION SUBCUTANEOUS at 21:30

## 2019-07-31 RX ADMIN — TAZOBACTAM SODIUM AND PIPERACILLIN SODIUM 3.38 G: 375; 3 INJECTION, SOLUTION INTRAVENOUS at 14:11

## 2019-07-31 RX ADMIN — INSULIN DETEMIR 10 UNITS: 100 INJECTION, SOLUTION SUBCUTANEOUS at 09:23

## 2019-07-31 RX ADMIN — Medication 250 MG: at 09:21

## 2019-08-01 LAB
ALBUMIN SERPL-MCNC: 2.4 G/DL (ref 3.5–5.2)
ALBUMIN/GLOB SERPL: 0.8 G/DL
ALP SERPL-CCNC: 92 U/L (ref 39–117)
ALT SERPL W P-5'-P-CCNC: 14 U/L (ref 1–33)
ANION GAP SERPL CALCULATED.3IONS-SCNC: 14 MMOL/L (ref 5–15)
AST SERPL-CCNC: 19 U/L (ref 1–32)
BILIRUB SERPL-MCNC: 0.2 MG/DL (ref 0.2–1.2)
BUN BLD-MCNC: 39 MG/DL (ref 8–23)
BUN/CREAT SERPL: 25.7 (ref 7–25)
CALCIUM SPEC-SCNC: 8.7 MG/DL (ref 8.6–10.5)
CHLORIDE SERPL-SCNC: 103 MMOL/L (ref 98–107)
CO2 SERPL-SCNC: 21 MMOL/L (ref 22–29)
CREAT BLD-MCNC: 1.52 MG/DL (ref 0.57–1)
DEPRECATED RDW RBC AUTO: 54.4 FL (ref 37–54)
ERYTHROCYTE [DISTWIDTH] IN BLOOD BY AUTOMATED COUNT: 15 % (ref 12.3–15.4)
GFR SERPL CREATININE-BSD FRML MDRD: 42 ML/MIN/1.73
GLOBULIN UR ELPH-MCNC: 3.1 GM/DL
GLUCOSE BLD-MCNC: 135 MG/DL (ref 65–99)
GLUCOSE BLDC GLUCOMTR-MCNC: 105 MG/DL (ref 70–130)
GLUCOSE BLDC GLUCOMTR-MCNC: 135 MG/DL (ref 70–130)
GLUCOSE BLDC GLUCOMTR-MCNC: 154 MG/DL (ref 70–130)
GLUCOSE BLDC GLUCOMTR-MCNC: 179 MG/DL (ref 70–130)
HCT VFR BLD AUTO: 28 % (ref 34–46.6)
HGB BLD-MCNC: 8.9 G/DL (ref 12–15.9)
INR PPP: 1.8 (ref 0.85–1.16)
MCH RBC QN AUTO: 30.9 PG (ref 26.6–33)
MCHC RBC AUTO-ENTMCNC: 31.8 G/DL (ref 31.5–35.7)
MCV RBC AUTO: 97.2 FL (ref 79–97)
PLATELET # BLD AUTO: 240 10*3/MM3 (ref 140–450)
PMV BLD AUTO: 11.1 FL (ref 6–12)
POTASSIUM BLD-SCNC: 4.5 MMOL/L (ref 3.5–5.2)
PROT SERPL-MCNC: 5.5 G/DL (ref 6–8.5)
PROTHROMBIN TIME: 20.1 SECONDS (ref 11.2–14.3)
RBC # BLD AUTO: 2.88 10*6/MM3 (ref 3.77–5.28)
SODIUM BLD-SCNC: 138 MMOL/L (ref 136–145)
WBC NRBC COR # BLD: 4.98 10*3/MM3 (ref 3.4–10.8)

## 2019-08-01 PROCEDURE — 63710000001 INSULIN DETEMIR PER 5 UNITS: Performed by: INTERNAL MEDICINE

## 2019-08-01 PROCEDURE — 80053 COMPREHEN METABOLIC PANEL: CPT | Performed by: INTERNAL MEDICINE

## 2019-08-01 PROCEDURE — 85610 PROTHROMBIN TIME: CPT | Performed by: FAMILY MEDICINE

## 2019-08-01 PROCEDURE — 94799 UNLISTED PULMONARY SVC/PX: CPT

## 2019-08-01 PROCEDURE — 99232 SBSQ HOSP IP/OBS MODERATE 35: CPT | Performed by: INTERNAL MEDICINE

## 2019-08-01 PROCEDURE — 25010000002 ONDANSETRON PER 1 MG: Performed by: FAMILY MEDICINE

## 2019-08-01 PROCEDURE — 25010000002 PIPERACILLIN SOD-TAZOBACTAM PER 1 G: Performed by: INTERNAL MEDICINE

## 2019-08-01 PROCEDURE — 25010000002 ENOXAPARIN PER 10 MG: Performed by: INTERNAL MEDICINE

## 2019-08-01 PROCEDURE — 82962 GLUCOSE BLOOD TEST: CPT

## 2019-08-01 PROCEDURE — 85027 COMPLETE CBC AUTOMATED: CPT | Performed by: INTERNAL MEDICINE

## 2019-08-01 RX ADMIN — ISOSORBIDE MONONITRATE 30 MG: 30 TABLET, EXTENDED RELEASE ORAL at 09:21

## 2019-08-01 RX ADMIN — LEVOTHYROXINE SODIUM 150 MCG: 150 TABLET ORAL at 06:17

## 2019-08-01 RX ADMIN — Medication 250 MG: at 09:21

## 2019-08-01 RX ADMIN — BUDESONIDE AND FORMOTEROL FUMARATE DIHYDRATE 2 PUFF: 80; 4.5 AEROSOL RESPIRATORY (INHALATION) at 08:40

## 2019-08-01 RX ADMIN — INSULIN DETEMIR 10 UNITS: 100 INJECTION, SOLUTION SUBCUTANEOUS at 20:42

## 2019-08-01 RX ADMIN — PANTOPRAZOLE SODIUM 40 MG: 40 TABLET, DELAYED RELEASE ORAL at 09:20

## 2019-08-01 RX ADMIN — INSULIN DETEMIR 10 UNITS: 100 INJECTION, SOLUTION SUBCUTANEOUS at 09:23

## 2019-08-01 RX ADMIN — CETIRIZINE HYDROCHLORIDE 5 MG: 10 TABLET, FILM COATED ORAL at 09:28

## 2019-08-01 RX ADMIN — BUDESONIDE AND FORMOTEROL FUMARATE DIHYDRATE 2 PUFF: 80; 4.5 AEROSOL RESPIRATORY (INHALATION) at 19:59

## 2019-08-01 RX ADMIN — ONDANSETRON 4 MG: 2 INJECTION INTRAMUSCULAR; INTRAVENOUS at 20:41

## 2019-08-01 RX ADMIN — LOPERAMIDE HYDROCHLORIDE 2 MG: 2 CAPSULE ORAL at 11:08

## 2019-08-01 RX ADMIN — TAZOBACTAM SODIUM AND PIPERACILLIN SODIUM 3.38 G: 375; 3 INJECTION, SOLUTION INTRAVENOUS at 06:17

## 2019-08-01 RX ADMIN — ENOXAPARIN SODIUM 70 MG: 80 INJECTION SUBCUTANEOUS at 09:21

## 2019-08-01 RX ADMIN — ASPIRIN 81 MG: 81 TABLET, COATED ORAL at 09:21

## 2019-08-01 RX ADMIN — FERROUS SULFATE TAB 325 MG (65 MG ELEMENTAL FE) 325 MG: 325 (65 FE) TAB at 09:20

## 2019-08-01 RX ADMIN — INSULIN LISPRO 7 UNITS: 100 INJECTION, SOLUTION INTRAVENOUS; SUBCUTANEOUS at 12:23

## 2019-08-01 RX ADMIN — SODIUM CHLORIDE, PRESERVATIVE FREE 3 ML: 5 INJECTION INTRAVENOUS at 20:44

## 2019-08-01 RX ADMIN — INSULIN LISPRO 7 UNITS: 100 INJECTION, SOLUTION INTRAVENOUS; SUBCUTANEOUS at 09:22

## 2019-08-01 RX ADMIN — ENOXAPARIN SODIUM 70 MG: 80 INJECTION SUBCUTANEOUS at 20:41

## 2019-08-01 RX ADMIN — INSULIN LISPRO 7 UNITS: 100 INJECTION, SOLUTION INTRAVENOUS; SUBCUTANEOUS at 17:14

## 2019-08-01 RX ADMIN — INSULIN LISPRO 2 UNITS: 100 INJECTION, SOLUTION INTRAVENOUS; SUBCUTANEOUS at 17:14

## 2019-08-01 RX ADMIN — ALLOPURINOL 100 MG: 100 TABLET ORAL at 09:20

## 2019-08-01 RX ADMIN — WARFARIN SODIUM 7.5 MG: 7.5 TABLET ORAL at 17:13

## 2019-08-01 RX ADMIN — CARVEDILOL 6.25 MG: 12.5 TABLET, FILM COATED ORAL at 09:21

## 2019-08-01 RX ADMIN — TAZOBACTAM SODIUM AND PIPERACILLIN SODIUM 3.38 G: 375; 3 INJECTION, SOLUTION INTRAVENOUS at 13:05

## 2019-08-01 RX ADMIN — INSULIN LISPRO 2 UNITS: 100 INJECTION, SOLUTION INTRAVENOUS; SUBCUTANEOUS at 12:24

## 2019-08-01 RX ADMIN — ATORVASTATIN CALCIUM 20 MG: 20 TABLET, FILM COATED ORAL at 09:20

## 2019-08-01 RX ADMIN — LOPERAMIDE HYDROCHLORIDE 2 MG: 2 CAPSULE ORAL at 17:13

## 2019-08-01 RX ADMIN — TAZOBACTAM SODIUM AND PIPERACILLIN SODIUM 3.38 G: 375; 3 INJECTION, SOLUTION INTRAVENOUS at 23:11

## 2019-08-01 NOTE — PROGRESS NOTES
[facility] Progress Note        Antibiotics:  Anti-Infectives (From admission, onward)    Ordered     Dose/Rate Route Frequency Start Stop    07/29/19 0733  piperacillin-tazobactam (ZOSYN) 3.375 g in iso-osmotic dextrose 50 ml (premix)     Ordering Provider:  Luis Miller MD    3.375 g  over 4 Hours Intravenous Every 8 Hours 07/29/19 1430 08/08/19 1429    07/29/19 0733  piperacillin-tazobactam (ZOSYN) 3.375 g in iso-osmotic dextrose 50 ml (premix)     Ordering Provider:  Luis Miller MD    3.375 g  over 30 Minutes Intravenous Once 07/29/19 0830 07/29/19 0920    07/27/19 0853  meropenem (MERREM) 1 g/100 mL 0.9% NS VTB (mbp)     Ordering Provider:  Luis Miller MD    1 g  over 30 Minutes Intravenous Once 07/27/19 0945 07/27/19 1037          CC: abd pain    HPI:    Patient is a 64 y.o.  Yr old female with history of diabetes mellitus, history of valve replacement on chronic anticoagulation with Coumadin, chronic kidney disease described as stage II by internal medicine.  She was admitted to [facility] July 26 with acute onset nausea/vomiting, generalized fatigue, dysuria and abdominal pain.  Subsequent fever exceeding 103 and blood culture positivity for GNR.     8/1/19 generally fatigued and feels debilitated;  No abd pain.  She  had nausea/vomiting, now better and  denies diarrhea.  No hematochezia melena or hematemesis.  No jaundice.     She has less dysuria with less urinary frequency and denies urinary incontinence.  No hematuria or pyuria and currently denies flank pain.       No headache photophobia or neck stiffness.  Denies cough or hemoptysis.  No skin rash.         ROS:      8/1/19 No f/c/s. No n/v/d. No rash. No new ADR to Abx.       Constitutional-- generally fatigued with malaise and poor appetite  Heent-- No new vision, hearing or throat complaints.  No epistaxis or oral sores.  Denies odynophagia or dysphagia.  No flashers, floaters or eye pain. No odynophagia or dysphagia.  "No headache, photophobia or neck stiffness.  CV-- No chest pain, palpitation or syncope  Resp-- No SOB/cough/Hemoptysis  GI- -- No dysuria, hematuria, or flank pain.  Denies hesitancy, urgency or flank pain.  Lymph- no swollen lymph nodes in neck/axilla or groin.   Heme- No active bruising or bleeding; no Hx of DVT or PE.  MS-- no swelling or pain in the bones or joints of arms/legs.  No new back pain.  Neuro-- No acute focal weakness or numbness in the arms or legs.  No seizures.     Full 12 point review of systems reviewed and negative otherwise for acute complaints, except for above          PE:   /54 (BP Location: Right arm, Patient Position: Lying)   Pulse 64   Temp 97.7 °F (36.5 °C) (Oral)   Resp 16   Ht 157.5 cm (62\")   Wt 74.8 kg (165 lb)   SpO2 (!) 78%   BMI 30.18 kg/m²       GENERAL:sleepy, in no acute distress.  Appears chronically ill, nasal cannula oxygen  HEENT: Normocephalic, atraumatic.  PERRL. EOMI. No conjunctival injection. No icterus. Oropharynx clear without evidence of thrush or exudate. No evidence of peridontal disease.    NECK: Supple without nuchal rigidity. No mass.  LYMPH: No cervical, axillary or inguinal lymphadenopathy.  HEART: RRR; No murmur, rubs, gallops.   LUNGS: Diminished at bases bilaterally without wheezing, rales, rhonchi. Normal respiratory effort. Nonlabored. No dullness.  ABDOMEN: Soft,less tender, nondistended. Positive bowel sounds. No rebound or guarding. NO mass or HSM.  EXT:  No cyanosis, clubbing or edema. No cord.  : Genitalia generally unremarkable.  Without Horton catheter.  MSK: FROM without joint effusions noted arms/legs.    SKIN: Warm and dry without cutaneous eruptions on Inspection/palpation.    NEURO: sleepy     No peripheral stigmata/phenomena of endocarditis     No CVA tenderness    Laboratory Data    Results from last 7 days   Lab Units 08/01/19  0924 07/30/19  0359 07/29/19  2036   WBC 10*3/mm3 4.98 3.92 4.95   HEMOGLOBIN g/dL 8.9* 8.3* " 9.4*   HEMATOCRIT % 28.0* 27.2* 30.2*   PLATELETS 10*3/mm3 240 145 236     Results from last 7 days   Lab Units 08/01/19  0924   SODIUM mmol/L 138   POTASSIUM mmol/L 4.5   CHLORIDE mmol/L 103   CO2 mmol/L 21.0*   BUN mg/dL 39*   CREATININE mg/dL 1.52*   GLUCOSE mg/dL 135*   CALCIUM mg/dL 8.7     Results from last 7 days   Lab Units 08/01/19  0924   ALK PHOS U/L 92   BILIRUBIN mg/dL 0.2   ALT (SGPT) U/L 14   AST (SGOT) U/L 19               Estimated Creatinine Clearance: 35 mL/min (A) (by C-G formula based on SCr of 1.52 mg/dL (H)).      Microbiology:      Radiology:  Imaging Results (last 72 hours)     Procedure Component Value Units Date/Time    CT Abdomen Pelvis Without Contrast [[id number]] Collected:  07/27/19 1403     Updated:  07/27/19 1601    Narrative:       EXAMINATION: CT ABDOMEN/PELVIS WO CONTRAST - 07/27/2019      INDICATION: Abd pain, fever.     TECHNIQUE: Unenhanced CT imaging of abdomen and pelvis was performed  with oral contrast.     The radiation dose reduction device was turned on for each scan per the  ALARA (As Low as Reasonably Achievable) protocol.     COMPARISON: NONE     FINDINGS:      The visualized lower lungs demonstrate a small right-sided partially  loculated pleural effusion. Trace left pleural effusion is identified.  Probable round atelectasis noted within the left lower lobe. Hazy  airspace changes are also noted involving the bilateral lobes which  could relate to pneumonia. Incompletely imaged valve device likely  within the aorta. The heart is enlarged. Additional mitral valve changes  identified.     The liver does not demonstrate acute abnormality. Mild intrahepatic  biliary ductal dilation identified. Hypodensity noted within the  gallbladder. No CT evidence of acute cholecystitis. The spleen is not  enlarged. The pancreas does not demonstrate abnormality. No  peripancreatic fluid collection identified. Lack of intra-abdominal fat  limits evaluation of visceral structures.      Kidneys are symmetric in size. Prominence of the left renal collecting  system is identified. Prominence of the left ureter is identified. No  evidence of obstructive uropathy. The right ureteral system is not  enlarged. Urinary bladder is partially distended and demonstrates mild  wall thickening.     Distal circumferential esophageal wall thickening identified. The  stomach is decompressed and otherwise unremarkable. Contrast-filled  loops of small bowel are identified, none of which are particularly  enlarged. The largest measures up to 2.2 cm. No evidence of bowel  obstruction. The appendix is not definitively identified secondary to  paucity of intra-abdominal fat. Mild colonic wall thickening identified  particularly involving the distal sigmoid colon. Additional rectal ball  is identified within the distal rectum measuring up to 6.1 cm with mild  rectal wall thickening and perirectal inflammation suggesting proctitis.     The osseous structures appear intact. Degenerative changes the lumbar  spine are identified maximally at L4-L5 with there is grade 1 anterior  listhesis of L4 on L5. No pars and articularis defects identified.  Multilevel vacuum disc phenomena identified. Nonspecific prominent  inguinal lymph nodes with diffuse edema involving the soft tissues.       Impression:          Nonspecific multifocal colonic wall thickening predominantly involving  the distal sigmoid colon and rectum with a 6.1 cm rectal stool ball with  surrounding perirectal inflammation. This is concerning for distal  colitis/stercoral colitis.     Mild prominence of the left ureteral system without evidence for  obstructive uropathy. Mild perinephric inflammation identified.  Correlate for the possibility of underlying ureteritis/pyelonephritis.     Nonvisualization of the appendix.     Bilateral lower lobe trace pleural effusions with lower lobe airspace  disease.     Additional nonspecific distal esophageal wall  thickening.     DICTATED:   07/27/2019  EDITED/ls :   07/27/2019        XR Chest 1 View [[id number]] Collected:  07/26/19 2318     Updated:  07/26/19 2320    Narrative:       Chest 1 view 7/26/2019    INDICATION: Shortness of breath today, aspiration. Type 2 diabetes with ketoacidosis. Benign essential hypertension.    FINDINGS: The heart is enlarged but stable following median sternotomy compared with the previous chest radiograph performed earlier today at 9:08 PM. There are bilateral pleural effusions layering posteriorly with bibasilar atelectasis or infiltrates.  No pneumothorax. Surgical chain sutures left upper lobe.      Impression:       No interval change compared with the previous chest radiograph performed earlier today at 9:08 PM.    Signer Name: Brent Moraes MD   Signed: 7/26/2019 11:18 PM   Workstation Name: [id number]       XR Chest 1 View [[id number]] Collected:  07/26/19 2123     Updated:  07/26/19 2125    Narrative:       CR Chest 1 Vw    INDICATION:   Diabetic ketoacidosis, hypertension, chronic kidney disease. Evaluate for infectious process     COMPARISON:    Chest film 5/11/2015    FINDINGS:  Portable AP view of the chest.  There is median sternotomy change with mild cardiac megaly unchanged. There is pulmonary venous distention with mild perihilar and basilar interstitial change similar to prior study. There is pleural prominence laterally  in the right greater than left hemithorax likely representing increased fat rather than fluid. There is trace blunting of both costophrenic sulci similar to prior study which could represent chronic pleural fluid or pleural thickening. There is a suture  line in the left upper lung unchanged graft      Impression:       No definite change from prior study. There is mild blunting of both costophrenic sulci which could indicate chronic pleural thickening or pleural fluid. There is mild perihilar interstitial prominence which appears chronic. There is  postoperative change  with a vertically oriented suture line in the left upper lung    Signer Name: Chani Avina MD   Signed: 7/26/2019 9:23 PM   Workstation Name: [id number]               Impression:     --Acute sepsis.  Gram-negative srinivas septicemia with concern for urinary  source.  Resuscitative measures ongoing per internal medicine.   CT scan abdomen noted.     --Acute E Coli septicemia.  Urinary source is a primary concern with urinary symptoms and abnormal urinalysis.        --Acute dysuria/frequency with acute pyuria/hematuria on urinalysis and E Coli urinary tract infection.    CT scan as above to exclude obstruction     --Acute abdominal pain.  As above.  May relate to intra-abdominal process versus DK.  CT scan with vague inflammatory change per radiology;  You should consider further GI workup at some point, ?inpatient -v- outpatient.  No diarrhea at present.     --Acute DKA.  Missed insulin doses per notes.  Per internal medicine otherwise.     --Chronic anticoagulation associated with prosthetic heart valve.  You need to monitor Coumadin closely while on antimicrobials     --Acute kidney injury with description of prior chronic kidney disease stage II.  Likely prerenal associated with ATN and dehydration/sepsis etc.           PLAN:     --IV zosyn     --Check/review labs cultures and scans     --History per nursing staff and family as well     --Discussed with microbiology     --CT scan noted     --Discussed with Dr. Watt     --Highly complex set of issues with high risk for further serious morbidity and other serious sequela    ?rehab           Luis Miller MD  8/1/2019

## 2019-08-01 NOTE — PROGRESS NOTES
[facility] Medicine Services  PROGRESS NOTE    Patient Name: Kasia Stewart  : 1955  MRN: [id number]    Date of Admission: 2019  Length of Stay: 6  Primary Care Physician: Melissa Cabrales MD    Subjective   Subjective     CC:  N/V, severe weakness    HPI:  Resting in bed in no acute distress and overall feels better.  No abdominal pain or nausea vomiting but still has diarrhea.   No fever or chills.  No chest pain, palpitation, shortness of breath at rest.    Review of Systems      Otherwise ROS is negative except as mentioned in the HPI.    Objective   Objective     Vital Signs:   Temp:  [97.7 °F (36.5 °C)-98.5 °F (36.9 °C)] 97.7 °F (36.5 °C)  Heart Rate:  [64-68] 64  Resp:  [16-18] 16  BP: (109-143)/(40-58) 143/54        Physical Exam:  Constitutional: Awake, alert  Eyes: PERRLA, sclerae anicteric, no conjunctival injection  HENT: NCAT, mucous membranes moist  Neck: Supple, no thyromegaly, no lymphadenopathy, trachea midline  Respiratory: Clear to auscultation bilaterally, nonlabored respirations   Cardiovascular: RRR, no murmurs, rubs, or gallops.  Gastrointestinal: Positive bowel sounds, soft, nontender, nondistended  Musculoskeletal: Low muscle mass, severe bilateral ankle edema.  Psychiatric: Appropriate affect, cooperative  Neurologic: Oriented x 3, strength symmetric in all extremities, Cranial Nerves grossly intact to confrontation, speech clear  Skin: No rashes    Results Reviewed:  I have personally reviewed current lab, radiology, and data and agree.    Results from last 7 days   Lab Units 19  04519   WBC 10*3/mm3 4.98  --  3.92 4.95   HEMOGLOBIN g/dL 8.9*  --  8.3* 9.4*   HEMATOCRIT % 28.0*  --  27.2* 30.2*   PLATELETS 10*3/mm3 240  --  145 236   INR  1.80* 1.76* 1.31*  --      Results from last 7 days   Lab Units 19  0919  03519   SODIUM mmol/L 138 134* 137   POTASSIUM mmol/L  4.5 4.7 4.7   CHLORIDE mmol/L 103 105 103   CO2 mmol/L 21.0* 21.0* 23.0   BUN mg/dL 39* 43* 40*   CREATININE mg/dL 1.52* 1.34* 1.46*   GLUCOSE mg/dL 135* 199* 158*   CALCIUM mg/dL 8.7 8.3* 8.5*   ALT (SGPT) U/L 14 16 19   AST (SGOT) U/L 19 24 26   TROPONIN T ng/mL  --   --  0.017     Estimated Creatinine Clearance: 35 mL/min (A) (by C-G formula based on SCr of 1.52 mg/dL (H)).    Microbiology Results Abnormal     Procedure Component Value - Date/Time    Blood Culture - Blood, Arm, Left [[id number]]  (Abnormal) Collected:  07/26/19 1550    Lab Status:  Final result Specimen:  Blood from Arm, Left Updated:  07/29/19 0602     Blood Culture Escherichia coli     Comment: Refer to previous blood culture collected on 7/26/2019 1540 for MICs          Gram Stain Aerobic Bottle Gram negative bacilli      Anaerobic Bottle Gram negative bacilli    Blood Culture - Blood, Hand, Right [[id number]]  (Abnormal)  (Susceptibility) Collected:  07/26/19 1540    Lab Status:  Final result Specimen:  Blood from Hand, Right Updated:  07/29/19 0559     Blood Culture Escherichia coli     Gram Stain Aerobic Bottle Gram negative bacilli      Anaerobic Bottle Gram negative bacilli    Susceptibility      Escherichia coli     EMANUEL     Ampicillin Susceptible     Ampicillin + Sulbactam Susceptible     Cefazolin Susceptible     Cefepime Susceptible     Ceftazidime Susceptible     Ceftriaxone Susceptible     Gentamicin Susceptible     Levofloxacin Susceptible     Piperacillin + Tazobactam Susceptible     Trimethoprim + Sulfamethoxazole Susceptible                    Urine Culture - Urine, Urine, Clean Catch [[id number]]  (Abnormal)  (Susceptibility) Collected:  07/26/19 2125    Lab Status:  Final result Specimen:  Urine, Clean Catch Updated:  07/28/19 1144     Urine Culture >100,000 CFU/mL Escherichia coli    Susceptibility      Escherichia coli     EMANUEL     Ampicillin Susceptible     Ampicillin + Sulbactam Susceptible     Cefazolin Susceptible      Cefepime Susceptible     Ceftazidime Susceptible     Ceftriaxone Susceptible     Gentamicin Susceptible     Levofloxacin Susceptible     Nitrofurantoin Susceptible     Piperacillin + Tazobactam Susceptible     Tetracycline Susceptible     Trimethoprim + Sulfamethoxazole Susceptible                    Blood Culture ID, PCR - Blood, Hand, Right [[id number]]  (Abnormal) Collected:  07/26/19 1540    Lab Status:  Final result Specimen:  Blood from Hand, Right Updated:  07/27/19 0829     BCID, PCR Escherichia coli. Identification by BCID PCR.          Imaging Results (last 24 hours)     ** No results found for the last 24 hours. **               I have reviewed the medications:  Scheduled Meds:    allopurinol 100 mg Oral Daily   aspirin 81 mg Oral Daily   atorvastatin 20 mg Oral Daily   budesonide-formoterol 2 puff Inhalation BID - RT   carvedilol 6.25 mg Oral BID With Meals   cetirizine 5 mg Oral Daily   enoxaparin 1 mg/kg Subcutaneous Q12H   ferrous sulfate 325 mg Oral Daily With Breakfast   insulin detemir 10 Units Subcutaneous Q12H   insulin lispro 0-9 Units Subcutaneous 4x Daily With Meals & Nightly   insulin lispro 7 Units Subcutaneous TID With Meals   isosorbide mononitrate 30 mg Oral Daily   levothyroxine 150 mcg Oral Q AM   pantoprazole 40 mg Oral Daily   piperacillin-tazobactam 3.375 g Intravenous Q8H   saccharomyces boulardii 250 mg Oral Daily   sodium chloride 3 mL Intravenous Q12H   warfarin 3.75 mg Oral Once per day on Sun Mon Wed Fri   warfarin 7.5 mg Oral Once per day on Tue Thu Sat     Continuous Infusions:    dextrose 5 % and sodium chloride 0.45 % 250 mL/hr    dextrose 5 % and sodium chloride 0.45 % with KCl 20 mEq/L 250 mL/hr    dextrose 5 % and sodium chloride 0.45 % with KCl 40 mEq/L 250 mL/hr    Pharmacy to dose warfarin     custom IV KCl infusion builder 250 mL/hr    sodium chloride 250 mL/hr    sodium chloride 0.45 % with KCl 20 mEq 250 mL/hr    sodium chloride 30 mL/hr    sodium chloride 250 mL/hr     sodium chloride 10 mL/hr    sodium chloride 0.9 % with KCl 20 mEq 250 mL/hr Last Rate: Stopped (07/27/19 0326)   sodium chloride 0.9 % with KCl 40 mEq/L 250 mL/hr      PRN Meds:.•  acetaminophen  •  acetaminophen  •  bisacodyl  •  calcium carbonate  •  dextrose  •  dextrose  •  dextrose  •  dextrose 5 % and sodium chloride 0.45 %  •  dextrose 5 % and sodium chloride 0.45 % with KCl 20 mEq/L  •  dextrose 5 % and sodium chloride 0.45 % with KCl 40 mEq/L  •  fluticasone  •  glucagon (human recombinant)  •  loperamide  •  ondansetron  •  Pharmacy to dose warfarin  •  custom IV KCl infusion builder  •  sodium chloride  •  sodium chloride 0.45 % with KCl 20 mEq  •  sodium chloride  •  sodium chloride  •  sodium chloride  •  sodium chloride  •  sodium chloride  •  sodium chloride  •  sodium chloride  •  sodium chloride  •  sodium chloride 0.9 % with KCl 20 mEq  •  sodium chloride 0.9 % with KCl 40 mEq/L      Assessment/Plan   Assessment / Plan     Active Hospital Problems    Diagnosis  POA   • **Diabetic ketoacidosis without coma associated with type 2 diabetes mellitus (CMS/HCC) [E11.10]  Yes   • History of heart valve replacement [Z95.2]  Not Applicable   • Chronic anticoagulation [Z79.01]  Not Applicable   • Hypertension [I10]  Yes   • CKD (chronic kidney disease), stage II [N18.2]  Yes   • UTI (urinary tract infection), bacterial [N39.0, A49.9]  Yes      Resolved Hospital Problems   No resolved problems to display.        Brief Hospital Course to date:  Kasia Stewart is a 64 y.o. female with past medical history significant for diabetes mellitus, chronic kidney disease, hypertension, multiple hospitalization for nausea vomiting secondary to diabetic ketoacidosis.  Patient was admitted for nausea vomiting and severe weakness secondary to hyperglycemia and diabetic ketoacidosis.  Patient also was febrile and blood cultures showed gram-negative srinivas bacteremia.    Assessment:    *Hyperglycemia and diabetic  ketoacidosis.  Ketoacidosis has resolved after insulin drip and  fluid resuscitation.    *Nausea, vomiting, abdominal pain secondary to ketoacidosis.  Family tells me that she has had EGD done recently at [facility] however we have not been able to obtain the results.    *E. coli UTI and bacteremia.  Patient currently is on meropenem and remains afebrile.    *Sepsis with leukocytosis and fever secondary to above resolved.    *Dehydration secondary to hyperglycemia resolved with IV fluid replacement.    *Severe and chronic edema of lower extremities, left more than right. Bilateral duplex was negative for DVT.    *Valvular heart disease status post aortic valve replacement and patient is on chronic Coumadin therapy.    *  protein malnutrition with low albumin and low total protein.    PLAN:    - monitor INR and continue coumadin.  - regular diet  - HP ensure  - labs in am.      DVT Prophylaxis: Anticoagulated    Disposition: I expect the patient to be discharged home in a few days.    CODE STATUS:   Code Status and Medical Interventions:   Ordered at: 07/26/19 3279     Code Status:    CPR     Medical Interventions (Level of Support Prior to Arrest):    Full         Electronically signed by Regino Watt MD, 08/01/19, 3:56 PM.

## 2019-08-01 NOTE — PROGRESS NOTES
"Pharmacy Consult  -  Warfarin    Kasia Stewart is a  64 y.o. female   Height - 157.5 cm (62\")  Weight - 74.8 kg (165 lb)    Consulting Provider: - Hospitalist  Indication: - aortic Mechanical Valve  Goal INR: - 2-3  Home Regimen:   - Warfarin 3.75mg Sunday, Monday, Wednesday, Friday             - Warfarin 7.5mg Tuesday, Thursday, Saturday    Bridge Therapy: enoxaparin bridge    Drug-Drug Interactions with current regimen:   Aspirin - increased bleeding risk              Levothyroxine - increased bleeding risk              Pantoprazole - may increase INR    Warfarin Dosing During Admission:    Date  7/26 7/27 7/28 7/29 7/30 7/31 8/1     INR  2.63 3.47 3.47 2.11 1.31 1.76 1.8     Dose  3.75 Hold dose Hold dose 7.5mg 7.5mg  3.75mg 7.5mg         Education Provided: Patient is on warfarin prior to admission.  Education provided 7/29 verbally and in witing.  Discussed effects of warfarin, importance of checking INR, drug-drug and drug-food interactions, and signs/symptoms of bleeding and clotting.  Patient verbalized understanding through teach back.  All pertinent questions were answered.        Discharge Follow up:   Following Provider - Dr. Abdullahi White   Follow up time range or appointment - 2-3 days following discharge      Labs:    Results from last 7 days   Lab Units 08/01/19 0924 07/31/19 0451 07/30/19 0359 07/29/19 2036 07/29/19  0737 07/28/19  0426 07/27/19  0421 07/26/19  1516   INR  1.80* 1.76* 1.31*  --  2.11* 3.47* 3.47* 2.63*   HEMOGLOBIN g/dL 8.9*  --  8.3* 9.4*  --  8.5*  --  10.3*   HEMATOCRIT % 28.0*  --  27.2* 30.2*  --  26.6*  --  33.1*   PLATELETS 10*3/mm3 240  --  145 236  --  235  --  278     Results from last 7 days   Lab Units 08/01/19 0924 07/30/19 0359 07/29/19 2036   SODIUM mmol/L 138 134* 137   POTASSIUM mmol/L 4.5 4.7 4.7   CHLORIDE mmol/L 103 105 103   CO2 mmol/L 21.0* 21.0* 23.0   BUN mg/dL 39* 43* 40*   CREATININE mg/dL 1.52* 1.34* 1.46*   CALCIUM mg/dL 8.7 8.3* 8.5* "   BILIRUBIN mg/dL 0.2 0.2 0.2   ALK PHOS U/L 92 87 105   ALT (SGPT) U/L 14 16 19   AST (SGOT) U/L 19 24 26   GLUCOSE mg/dL 135* 199* 158*       Current dietary intake: 75% of documented meals  Diet Order   Procedures   • Diet Regular; Consistent Carbohydrate, Cardiac         Assessment/Plan:   Pharmacy to dose warfarin for aortic mechanical valve,   Goal INR 2-3.  8/1 INR - 1.8, increased from 1.76  8/1 H/H - 8.9/28.0    continue admission regimen:             - Warfarin 3.75mg Sunday, Monday, Wednesday, Friday             - Warfarin 7.5mg Tuesday, Thursday, Saturday  Bridge with enoxaparin 1mg/kg q12h until INR>2  Monitor s/s bleeding/clotting, clinical status, dietary intake and drug-drug interactions  Follow daily INR and adjust dose accordingly    Thank you  Sea Jenkins RPH   8/1/2019  10:17 AM

## 2019-08-01 NOTE — PLAN OF CARE
Problem: Patient Care Overview  Goal: Plan of Care Review  Outcome: Ongoing (interventions implemented as appropriate)   08/01/19 1030   Coping/Psychosocial   Plan of Care Reviewed With patient   Plan of Care Review   Progress no change   OTHER   Outcome Summary Patient presents with small abrasion/friciton area to her labia. Patient is incontinent and has a Purewick in place at this time. Keep area clean with barrier wipes PRN with soiling and apply thin layer of barrier cream. See order. If area does not resolve or gets worse discontinue use of Purewick and use dry flow pads only. Will continue to follow at this time. Please contact if needs arise. Thanks

## 2019-08-01 NOTE — PLAN OF CARE
Problem: Fall Risk (Adult)  Goal: Identify Related Risk Factors and Signs and Symptoms  Outcome: Ongoing (interventions implemented as appropriate)    Goal: Absence of Fall  Outcome: Ongoing (interventions implemented as appropriate)      Problem: Patient Care Overview  Goal: Plan of Care Review  Outcome: Ongoing (interventions implemented as appropriate)   08/01/19 0400   Coping/Psychosocial   Plan of Care Reviewed With patient   Plan of Care Review   Progress improving   OTHER   Outcome Summary No c/o pain or discomfort. VSS. Will continue to monitor.       Problem: Hyperglycemia, Persistent (Adult)  Goal: Signs and Symptoms of Listed Potential Problems Will be Absent, Minimized or Managed (Hyperglycemia, Persistent)  Outcome: Ongoing (interventions implemented as appropriate)      Problem: Skin Injury Risk (Adult)  Goal: Identify Related Risk Factors and Signs and Symptoms  Outcome: Ongoing (interventions implemented as appropriate)    Goal: Skin Health and Integrity  Outcome: Ongoing (interventions implemented as appropriate)

## 2019-08-01 NOTE — PROGRESS NOTES
Continued Stay Note   [facility]     Patient Name: Kasia Stewart  MRN: [id number]  Today's Date: 8/1/2019    Admit Date: 7/26/2019    Discharge Plan     Row Name 08/01/19 1420       Plan    Plan  update    Patient/Family in Agreement with Plan  yes    Plan Comments  Spoke with patient at bedside regarding discharge plan.  Patient is unsure when she will  be medically ready to go home but hopes it is soon.  Patient confirms that she is not interested in HH at all and reports that she has had multiple bad experiences with HH and never wants to have HH again.  Patient reports that she has all DME she needs at home.  CM following.  Go plan is to discharge home via car with family to transport when medically ready.     Final Discharge Disposition Code  01 - home or self-care        Discharge Codes    No documentation.       Expected Discharge Date and Time     Expected Discharge Date Expected Discharge Time    Aug 2, 2019             Ebonie Moffett RN

## 2019-08-02 LAB
GLUCOSE BLDC GLUCOMTR-MCNC: 103 MG/DL (ref 70–130)
GLUCOSE BLDC GLUCOMTR-MCNC: 281 MG/DL (ref 70–130)
GLUCOSE BLDC GLUCOMTR-MCNC: 320 MG/DL (ref 70–130)
GLUCOSE BLDC GLUCOMTR-MCNC: 345 MG/DL (ref 70–130)
GLUCOSE BLDC GLUCOMTR-MCNC: 405 MG/DL (ref 70–130)
GLUCOSE BLDC GLUCOMTR-MCNC: 50 MG/DL (ref 70–130)
GLUCOSE BLDC GLUCOMTR-MCNC: 63 MG/DL (ref 70–130)
GLUCOSE BLDC GLUCOMTR-MCNC: 72 MG/DL (ref 70–130)
GLUCOSE BLDC GLUCOMTR-MCNC: 99 MG/DL (ref 70–130)
HCT VFR BLD AUTO: 28.5 % (ref 34–46.6)
HGB BLD-MCNC: 8.9 G/DL (ref 12–15.9)
INR PPP: 2.06 (ref 0.85–1.16)
PROTHROMBIN TIME: 22.3 SECONDS (ref 11.2–14.3)

## 2019-08-02 PROCEDURE — 85610 PROTHROMBIN TIME: CPT | Performed by: FAMILY MEDICINE

## 2019-08-02 PROCEDURE — 25010000002 PIPERACILLIN SOD-TAZOBACTAM PER 1 G: Performed by: INTERNAL MEDICINE

## 2019-08-02 PROCEDURE — 85018 HEMOGLOBIN: CPT | Performed by: NURSE PRACTITIONER

## 2019-08-02 PROCEDURE — 94799 UNLISTED PULMONARY SVC/PX: CPT

## 2019-08-02 PROCEDURE — 99233 SBSQ HOSP IP/OBS HIGH 50: CPT | Performed by: INTERNAL MEDICINE

## 2019-08-02 PROCEDURE — 97161 PT EVAL LOW COMPLEX 20 MIN: CPT

## 2019-08-02 PROCEDURE — 97116 GAIT TRAINING THERAPY: CPT

## 2019-08-02 PROCEDURE — 85014 HEMATOCRIT: CPT | Performed by: NURSE PRACTITIONER

## 2019-08-02 PROCEDURE — 82962 GLUCOSE BLOOD TEST: CPT

## 2019-08-02 RX ORDER — LORAZEPAM 2 MG/ML
0.5 INJECTION INTRAMUSCULAR ONCE
Status: DISCONTINUED | OUTPATIENT
Start: 2019-08-02 | End: 2019-08-02

## 2019-08-02 RX ADMIN — BUDESONIDE AND FORMOTEROL FUMARATE DIHYDRATE 2 PUFF: 80; 4.5 AEROSOL RESPIRATORY (INHALATION) at 20:02

## 2019-08-02 RX ADMIN — TAZOBACTAM SODIUM AND PIPERACILLIN SODIUM 3.38 G: 375; 3 INJECTION, SOLUTION INTRAVENOUS at 23:20

## 2019-08-02 RX ADMIN — FERROUS SULFATE TAB 325 MG (65 MG ELEMENTAL FE) 325 MG: 325 (65 FE) TAB at 08:38

## 2019-08-02 RX ADMIN — WARFARIN SODIUM 3.75 MG: 7.5 TABLET ORAL at 18:11

## 2019-08-02 RX ADMIN — INSULIN LISPRO 7 UNITS: 100 INJECTION, SOLUTION INTRAVENOUS; SUBCUTANEOUS at 18:06

## 2019-08-02 RX ADMIN — CARVEDILOL 6.25 MG: 12.5 TABLET, FILM COATED ORAL at 08:38

## 2019-08-02 RX ADMIN — ALLOPURINOL 100 MG: 100 TABLET ORAL at 08:38

## 2019-08-02 RX ADMIN — ATORVASTATIN CALCIUM 20 MG: 20 TABLET, FILM COATED ORAL at 08:38

## 2019-08-02 RX ADMIN — DEXTROSE 50 % IN WATER (D50W) INTRAVENOUS SYRINGE 12.5 G: at 04:08

## 2019-08-02 RX ADMIN — TAZOBACTAM SODIUM AND PIPERACILLIN SODIUM 3.38 G: 375; 3 INJECTION, SOLUTION INTRAVENOUS at 15:49

## 2019-08-02 RX ADMIN — ISOSORBIDE MONONITRATE 30 MG: 30 TABLET, EXTENDED RELEASE ORAL at 08:38

## 2019-08-02 RX ADMIN — INSULIN LISPRO 6 UNITS: 100 INJECTION, SOLUTION INTRAVENOUS; SUBCUTANEOUS at 13:12

## 2019-08-02 RX ADMIN — TAZOBACTAM SODIUM AND PIPERACILLIN SODIUM 3.38 G: 375; 3 INJECTION, SOLUTION INTRAVENOUS at 06:00

## 2019-08-02 RX ADMIN — BUDESONIDE AND FORMOTEROL FUMARATE DIHYDRATE 2 PUFF: 80; 4.5 AEROSOL RESPIRATORY (INHALATION) at 10:55

## 2019-08-02 RX ADMIN — INSULIN LISPRO 7 UNITS: 100 INJECTION, SOLUTION INTRAVENOUS; SUBCUTANEOUS at 13:12

## 2019-08-02 RX ADMIN — PANTOPRAZOLE SODIUM 40 MG: 40 TABLET, DELAYED RELEASE ORAL at 08:38

## 2019-08-02 RX ADMIN — LOPERAMIDE HYDROCHLORIDE 2 MG: 2 CAPSULE ORAL at 10:15

## 2019-08-02 RX ADMIN — Medication 250 MG: at 08:38

## 2019-08-02 RX ADMIN — ASPIRIN 81 MG: 81 TABLET, COATED ORAL at 08:38

## 2019-08-02 RX ADMIN — INSULIN LISPRO 7 UNITS: 100 INJECTION, SOLUTION INTRAVENOUS; SUBCUTANEOUS at 21:22

## 2019-08-02 RX ADMIN — INSULIN LISPRO 9 UNITS: 100 INJECTION, SOLUTION INTRAVENOUS; SUBCUTANEOUS at 18:07

## 2019-08-02 RX ADMIN — LEVOTHYROXINE SODIUM 150 MCG: 150 TABLET ORAL at 06:00

## 2019-08-02 NOTE — PLAN OF CARE
Problem: Patient Care Overview  Goal: Plan of Care Review   08/02/19 9932   Coping/Psychosocial   Plan of Care Reviewed With patient   OTHER   Outcome Summary IP PT eval completed. Pt ambulated 300 ft with RW and CGA, limited by fatigue and weakness. Demonstrates impaired endurance, balance, strength. Recommend appropriate for home with HH PT upon d/c. Will continue to progress pt as able per POC.

## 2019-08-02 NOTE — PROGRESS NOTES
Continued Stay Note  [facility]     Patient Name: Kasia Stewart  MRN: [id number]  Today's Date: 8/2/2019    Admit Date: 7/26/2019    Discharge Plan     Row Name 08/02/19 1301       Plan    Plan  update    Patient/Family in Agreement with Plan  yes    Plan Comments  Spoke with patient at bedside regarding discharge plan.  Patient indicates that her sugar was low last night but doing better today.  Patient unsure of when she will be able to go home.  Per ID recs, patient to have antibiotic infusions in office.  No other discharge needs verbalized.  CM following.  Patient plan is to discharge home via car with family to transport when medically ready.     Final Discharge Disposition Code  01 - home or self-care        Discharge Codes    No documentation.       Expected Discharge Date and Time     Expected Discharge Date Expected Discharge Time    Aug 2, 2019             Ebonie Moffett RN

## 2019-08-02 NOTE — PLAN OF CARE
Problem: Fall Risk (Adult)  Goal: Identify Related Risk Factors and Signs and Symptoms  Outcome: Ongoing (interventions implemented as appropriate)   08/02/19 0458   Fall Risk (Adult)   Related Risk Factors (Fall Risk) age-related changes   Signs and Symptoms (Fall Risk) presence of risk factors     Goal: Absence of Fall  Outcome: Ongoing (interventions implemented as appropriate)   08/02/19 0458   Fall Risk (Adult)   Absence of Fall making progress toward outcome       Problem: Patient Care Overview  Goal: Plan of Care Review  Outcome: Ongoing (interventions implemented as appropriate)   08/02/19 0458   Coping/Psychosocial   Plan of Care Reviewed With patient   Plan of Care Review   Progress improving       Problem: Hyperglycemia, Persistent (Adult)  Goal: Signs and Symptoms of Listed Potential Problems Will be Absent, Minimized or Managed (Hyperglycemia, Persistent)  Outcome: Ongoing (interventions implemented as appropriate)  Blood sugar dropped during the night. Dextrose via IV administered and    08/02/19 0458   Goal/Outcome Evaluation   Problems Assessed (Hyperglycemia) all   Problems Present (Hyperglycemia) hypoglycemia       Problem: Skin Injury Risk (Adult)  Goal: Skin Health and Integrity  Outcome: Ongoing (interventions implemented as appropriate)   08/02/19 0458   Skin Injury Risk (Adult)   Skin Health and Integrity making progress toward outcome

## 2019-08-02 NOTE — PLAN OF CARE
Problem: Patient Care Overview  Goal: Plan of Care Review  Outcome: Ongoing (interventions implemented as appropriate)   08/02/19 1703   Coping/Psychosocial   Plan of Care Reviewed With patient   Plan of Care Review   Progress no change   OTHER   Outcome Summary Patient has large hematoma noted to left lower forearm. Area marked and MD aware and visualized. Blood glucose assessments remain unstable. Vitals stable. Continuing plan of care.     Goal: Interprofessional Rounds/Family Conf  Outcome: Ongoing (interventions implemented as appropriate)      Problem: Hyperglycemia, Persistent (Adult)  Goal: Signs and Symptoms of Listed Potential Problems Will be Absent, Minimized or Managed (Hyperglycemia, Persistent)  Outcome: Ongoing (interventions implemented as appropriate)      Problem: Skin Injury Risk (Adult)  Goal: Identify Related Risk Factors and Signs and Symptoms  Outcome: Ongoing (interventions implemented as appropriate)

## 2019-08-02 NOTE — PROGRESS NOTES
"Pharmacy Consult  -  Warfarin    Kasia Stewart is a  64 y.o. female   Height - 157.5 cm (62\")  Weight - 74.8 kg (165 lb)    Consulting Provider: - Hospitalist  Indication: - aortic Mechanical Valve  Goal INR: - 2-3  Home Regimen:   - Warfarin 3.75mg Sunday, Monday, Wednesday, Friday             - Warfarin 7.5mg Tuesday, Thursday, Saturday    Bridge Therapy: enoxaparin bridge d/c'ed 8/2    Drug-Drug Interactions with current regimen:   Aspirin - increased bleeding risk              Levothyroxine - increased bleeding risk              Pantoprazole - may increase INR    Warfarin Dosing During Admission:    Date  7/26 7/27 7/28 7/29 7/30 7/31 8/1 8/2    INR  2.63 3.47 3.47 2.11 1.31 1.76 1.8 2.06    Dose  3.75 Hold dose Hold dose 7.5mg 7.5mg  3.75mg 7.5mg 3.75        Education Provided: Patient is on warfarin prior to admission.  Education provided 7/29 verbally and in witing.  Discussed effects of warfarin, importance of checking INR, drug-drug and drug-food interactions, and signs/symptoms of bleeding and clotting.  Patient verbalized understanding through teach back.  All pertinent questions were answered.        Discharge Follow up:   Following Provider - Dr. Abdullahi White   Follow up time range or appointment - 2-3 days following discharge      Labs:    Results from last 7 days   Lab Units 08/02/19  0519 08/01/19  0924 07/31/19  0451 07/30/19  0359 07/29/19  2036 07/29/19  0737 07/28/19  0426 07/27/19  0421 07/26/19  1516   INR  2.06* 1.80* 1.76* 1.31*  --  2.11* 3.47* 3.47* 2.63*   HEMOGLOBIN g/dL 8.9* 8.9*  --  8.3* 9.4*  --  8.5*  --  10.3*   HEMATOCRIT % 28.5* 28.0*  --  27.2* 30.2*  --  26.6*  --  33.1*   PLATELETS 10*3/mm3  --  240  --  145 236  --  235  --  278     Results from last 7 days   Lab Units 08/01/19  0924 07/30/19  0359 07/29/19 2036   SODIUM mmol/L 138 134* 137   POTASSIUM mmol/L 4.5 4.7 4.7   CHLORIDE mmol/L 103 105 103   CO2 mmol/L 21.0* 21.0* 23.0   BUN mg/dL 39* 43* 40*   CREATININE " mg/dL 1.52* 1.34* 1.46*   CALCIUM mg/dL 8.7 8.3* 8.5*   BILIRUBIN mg/dL 0.2 0.2 0.2   ALK PHOS U/L 92 87 105   ALT (SGPT) U/L 14 16 19   AST (SGOT) U/L 19 24 26   GLUCOSE mg/dL 135* 199* 158*       Current dietary intake: % of documented meals  Diet Order   Procedures   • Diet Regular; Consistent Carbohydrate, Cardiac         Assessment/Plan:   Pharmacy to dose warfarin for aortic mechanical valve,   Goal INR 2-3.  8/2 INR - 2.06, increased from 1.8  8/2 H/H - 8.9/28.5    continue admission regimen:             - Warfarin 3.75mg Sunday, Monday, Wednesday, Friday             - Warfarin 7.5mg Tuesday, Thursday, Saturday  D/C enoxaparin bridge  Monitor s/s bleeding/clotting, clinical status, dietary intake and drug-drug interactions  Follow daily INR and adjust dose accordingly    Thank you  Sea Jenkins RPH   8/2/2019  7:10 AM

## 2019-08-02 NOTE — PROGRESS NOTES
[facility] Progress Note        Antibiotics:  Anti-Infectives (From admission, onward)    Ordered     Dose/Rate Route Frequency Start Stop    07/29/19 0733  piperacillin-tazobactam (ZOSYN) 3.375 g in iso-osmotic dextrose 50 ml (premix)     Ordering Provider:  Luis Miller MD    3.375 g  over 4 Hours Intravenous Every 8 Hours 07/29/19 1430 08/08/19 1429    07/29/19 0733  piperacillin-tazobactam (ZOSYN) 3.375 g in iso-osmotic dextrose 50 ml (premix)     Ordering Provider:  Luis Miller MD    3.375 g  over 30 Minutes Intravenous Once 07/29/19 0830 07/29/19 0920    07/27/19 0853  meropenem (MERREM) 1 g/100 mL 0.9% NS VTB (mbp)     Ordering Provider:  Luis Miller MD    1 g  over 30 Minutes Intravenous Once 07/27/19 0945 07/27/19 1037          CC: abd pain    HPI:    Patient is a 64 y.o.  Yr old female with history of diabetes mellitus, history of valve replacement on chronic anticoagulation with Coumadin, chronic kidney disease described as stage II by internal medicine.  She was admitted to [facility] July 26 with acute onset nausea/vomiting, generalized fatigue, dysuria and abdominal pain.  Subsequent fever exceeding 103 and blood culture positivity for GNR.     8/2/19 seen early and sleepy; generally fatigued and feels debilitated;  No abd pain.  She  had nausea/vomiting, now better and  denies diarrhea.  No hematochezia melena or hematemesis.  No jaundice.     She has less dysuria with less urinary frequency and denies urinary incontinence.  No hematuria or pyuria and currently denies flank pain.       No headache photophobia or neck stiffness.  Denies cough or hemoptysis.  No skin rash.         ROS:      8/2/19 No f/c/s. No n/v/d. No rash. No new ADR to Abx.       Constitutional-- generally fatigued with malaise and poor appetite  Heent-- No new vision, hearing or throat complaints.  No epistaxis or oral sores.  Denies odynophagia or dysphagia.  No flashers, floaters or eye pain. No  "odynophagia or dysphagia. No headache, photophobia or neck stiffness.  CV-- No chest pain, palpitation or syncope  Resp-- No SOB/cough/Hemoptysis  GI- -- No dysuria, hematuria, or flank pain.  Denies hesitancy, urgency or flank pain.  Lymph- no swollen lymph nodes in neck/axilla or groin.   Heme- No active bruising or bleeding; no Hx of DVT or PE.  MS-- no swelling or pain in the bones or joints of arms/legs.  No new back pain.  Neuro-- No acute focal weakness or numbness in the arms or legs.  No seizures.     Full 12 point review of systems reviewed and negative otherwise for acute complaints, except for above          PE:   /65 (BP Location: Right arm, Patient Position: Lying)   Pulse 67   Temp 98.1 °F (36.7 °C) (Oral)   Resp 16   Ht 157.5 cm (62\")   Wt 74.8 kg (165 lb)   SpO2 99%   BMI 30.18 kg/m²       GENERAL:sleepy, in no acute distress.  Appears chronically ill, nasal cannula oxygen  HEENT: Normocephalic, atraumatic.  PERRL. EOMI. No conjunctival injection. No icterus. Oropharynx clear without evidence of thrush or exudate. No evidence of peridontal disease.    NECK: Supple without nuchal rigidity. No mass.  LYMPH: No cervical, axillary or inguinal lymphadenopathy.  HEART: RRR; No murmur, rubs, gallops.   LUNGS: Diminished at bases bilaterally without wheezing, rales, rhonchi. Normal respiratory effort. Nonlabored. No dullness.  ABDOMEN: Soft,less tender, nondistended. Positive bowel sounds. No rebound or guarding. NO mass or HSM.  EXT:  No cyanosis, clubbing or edema. No cord.  : Genitalia generally unremarkable.  Without Horton catheter.  MSK: FROM without joint effusions noted arms/legs.    SKIN: Warm and dry without cutaneous eruptions on Inspection/palpation.    NEURO: sleepy     No peripheral stigmata/phenomena of endocarditis     No CVA tenderness    Laboratory Data    Results from last 7 days   Lab Units 08/02/19  0519 08/01/19  0924 07/30/19  0359 07/29/19  2036   WBC 10*3/mm3  --  " 4.98 3.92 4.95   HEMOGLOBIN g/dL 8.9* 8.9* 8.3* 9.4*   HEMATOCRIT % 28.5* 28.0* 27.2* 30.2*   PLATELETS 10*3/mm3  --  240 145 236     Results from last 7 days   Lab Units 08/01/19  0924   SODIUM mmol/L 138   POTASSIUM mmol/L 4.5   CHLORIDE mmol/L 103   CO2 mmol/L 21.0*   BUN mg/dL 39*   CREATININE mg/dL 1.52*   GLUCOSE mg/dL 135*   CALCIUM mg/dL 8.7     Results from last 7 days   Lab Units 08/01/19  0924   ALK PHOS U/L 92   BILIRUBIN mg/dL 0.2   ALT (SGPT) U/L 14   AST (SGOT) U/L 19               Estimated Creatinine Clearance: 35 mL/min (A) (by C-G formula based on SCr of 1.52 mg/dL (H)).      Microbiology:      Radiology:  Imaging Results (last 72 hours)     Procedure Component Value Units Date/Time    CT Abdomen Pelvis Without Contrast [[id number]] Collected:  07/27/19 1403     Updated:  07/27/19 1601    Narrative:       EXAMINATION: CT ABDOMEN/PELVIS WO CONTRAST - 07/27/2019      INDICATION: Abd pain, fever.     TECHNIQUE: Unenhanced CT imaging of abdomen and pelvis was performed  with oral contrast.     The radiation dose reduction device was turned on for each scan per the  ALARA (As Low as Reasonably Achievable) protocol.     COMPARISON: NONE     FINDINGS:      The visualized lower lungs demonstrate a small right-sided partially  loculated pleural effusion. Trace left pleural effusion is identified.  Probable round atelectasis noted within the left lower lobe. Hazy  airspace changes are also noted involving the bilateral lobes which  could relate to pneumonia. Incompletely imaged valve device likely  within the aorta. The heart is enlarged. Additional mitral valve changes  identified.     The liver does not demonstrate acute abnormality. Mild intrahepatic  biliary ductal dilation identified. Hypodensity noted within the  gallbladder. No CT evidence of acute cholecystitis. The spleen is not  enlarged. The pancreas does not demonstrate abnormality. No  peripancreatic fluid collection identified. Lack of  intra-abdominal fat  limits evaluation of visceral structures.     Kidneys are symmetric in size. Prominence of the left renal collecting  system is identified. Prominence of the left ureter is identified. No  evidence of obstructive uropathy. The right ureteral system is not  enlarged. Urinary bladder is partially distended and demonstrates mild  wall thickening.     Distal circumferential esophageal wall thickening identified. The  stomach is decompressed and otherwise unremarkable. Contrast-filled  loops of small bowel are identified, none of which are particularly  enlarged. The largest measures up to 2.2 cm. No evidence of bowel  obstruction. The appendix is not definitively identified secondary to  paucity of intra-abdominal fat. Mild colonic wall thickening identified  particularly involving the distal sigmoid colon. Additional rectal ball  is identified within the distal rectum measuring up to 6.1 cm with mild  rectal wall thickening and perirectal inflammation suggesting proctitis.     The osseous structures appear intact. Degenerative changes the lumbar  spine are identified maximally at L4-L5 with there is grade 1 anterior  listhesis of L4 on L5. No pars and articularis defects identified.  Multilevel vacuum disc phenomena identified. Nonspecific prominent  inguinal lymph nodes with diffuse edema involving the soft tissues.       Impression:          Nonspecific multifocal colonic wall thickening predominantly involving  the distal sigmoid colon and rectum with a 6.1 cm rectal stool ball with  surrounding perirectal inflammation. This is concerning for distal  colitis/stercoral colitis.     Mild prominence of the left ureteral system without evidence for  obstructive uropathy. Mild perinephric inflammation identified.  Correlate for the possibility of underlying ureteritis/pyelonephritis.     Nonvisualization of the appendix.     Bilateral lower lobe trace pleural effusions with lower lobe  airspace  disease.     Additional nonspecific distal esophageal wall thickening.     DICTATED:   07/27/2019  EDITED/ls :   07/27/2019        XR Chest 1 View [[id number]] Collected:  07/26/19 2318     Updated:  07/26/19 2320    Narrative:       Chest 1 view 7/26/2019    INDICATION: Shortness of breath today, aspiration. Type 2 diabetes with ketoacidosis. Benign essential hypertension.    FINDINGS: The heart is enlarged but stable following median sternotomy compared with the previous chest radiograph performed earlier today at 9:08 PM. There are bilateral pleural effusions layering posteriorly with bibasilar atelectasis or infiltrates.  No pneumothorax. Surgical chain sutures left upper lobe.      Impression:       No interval change compared with the previous chest radiograph performed earlier today at 9:08 PM.    Signer Name: Brent Moraes MD   Signed: 7/26/2019 11:18 PM   Workstation Name: [id number]       XR Chest 1 View [[id number]] Collected:  07/26/19 2123     Updated:  07/26/19 2125    Narrative:       CR Chest 1 Vw    INDICATION:   Diabetic ketoacidosis, hypertension, chronic kidney disease. Evaluate for infectious process     COMPARISON:    Chest film 5/11/2015    FINDINGS:  Portable AP view of the chest.  There is median sternotomy change with mild cardiac megaly unchanged. There is pulmonary venous distention with mild perihilar and basilar interstitial change similar to prior study. There is pleural prominence laterally  in the right greater than left hemithorax likely representing increased fat rather than fluid. There is trace blunting of both costophrenic sulci similar to prior study which could represent chronic pleural fluid or pleural thickening. There is a suture  line in the left upper lung unchanged graft      Impression:       No definite change from prior study. There is mild blunting of both costophrenic sulci which could indicate chronic pleural thickening or pleural fluid. There is mild  perihilar interstitial prominence which appears chronic. There is postoperative change  with a vertically oriented suture line in the left upper lung    Signer Name: Chani Avina MD   Signed: 7/26/2019 9:23 PM   Workstation Name: [id number]               Impression:     --Acute sepsis.  Gram-negative srinivas septicemia with concern for urinary  source.  Resuscitative measures ongoing per internal medicine.   CT scan abdomen noted.     --Acute E Coli septicemia.  Urinary source is a primary concern with urinary symptoms and abnormal urinalysis.        --Acute dysuria/frequency with acute pyuria/hematuria on urinalysis and E Coli urinary tract infection.    CT scan as above to exclude obstruction     --Acute abdominal pain.  As above.  May relate to intra-abdominal process versus DK.  CT scan with vague inflammatory change per radiology;  You should consider further GI workup at some point, ?inpatient -v- outpatient.  No diarrhea at present.     --Acute DKA.  Missed insulin doses per notes.  Per internal medicine otherwise.     --Chronic anticoagulation associated with prosthetic heart valve.  You need to monitor Coumadin closely while on antimicrobials     --Acute kidney injury with description of prior chronic kidney disease stage II.  Likely prerenal associated with ATN and dehydration/sepsis etc.           PLAN:     --IV zosyn for now;  At time of discharge I anticipate transition to IV Invanz to help facilitate once daily dosing in office to complete course (likely IV  abx to be continued to 8/8)     --Check/review labs cultures and scans     --History per nursing staff and family as well     --Discussed with microbiology     --CT scan noted     --Discussed with Dr. Watt     --Highly complex set of issues with high risk for further serious morbidity and other serious sequela    --likely daily IV Invanz in office daily after discharge as above           Luis Miller MD  8/2/2019

## 2019-08-02 NOTE — THERAPY EVALUATION
Acute Care - Physical Therapy Initial Evaluation  [facility]     Patient Name: Kasia Stewart  : 1955  MRN: [id number]  Today's Date: 2019   Onset of Illness/Injury or Date of Surgery: 19  Date of Referral to PT: 19  Referring Physician: MD Alysa      Admit Date: 2019    Visit Dx:     ICD-10-CM ICD-9-CM   1. Diabetic ketoacidosis without coma associated with type 2 diabetes mellitus (CMS/HCC) E11.10 250.12     Patient Active Problem List   Diagnosis   • Diabetic ketoacidosis without coma associated with type 2 diabetes mellitus (CMS/HCC)   • History of heart valve replacement   • Chronic anticoagulation   • Hypertension   • CKD (chronic kidney disease), stage II   • UTI (urinary tract infection), bacterial     Past Medical History:   Diagnosis Date   • Diabetes (CMS/HCC)    • Heart valve replaced     on chronic Coumadin therapy   • Hyperglycemia    • Hypertension    • Renal disease      Past Surgical History:   Procedure Laterality Date   • CARDIAC VALVE REPLACEMENT          PT ASSESSMENT (last 12 hours)      Physical Therapy Evaluation     Row Name 19 1064          PT Evaluation Time/Intention    Subjective Information  no complaints  -VG     Document Type  evaluation  -VG     Mode of Treatment  individual therapy;physical therapy  -VG     Patient Effort  good  -VG     Symptoms Noted During/After Treatment  fatigue  -VG     Row Name 19 3724          General Information    Patient Profile Reviewed?  yes  -VG     Onset of Illness/Injury or Date of Surgery  19  -VG     Referring Physician  MD Alysa  -VG     Patient Observations  alert;cooperative;agree to therapy  -VG     Patient/Family Observations  No family present  -VG     General Observations of Patient  In bed, RA, tele, IV, bed alarm  -VG     Prior Level of Function  independent:;all household mobility;ADL's;mod assist:;home management;dependent:;community mobility  -VG     Equipment Currently Used  at Home  cane, straight;rollator  -VG     Pertinent History of Current Functional Problem  Pt presents to ED 7/26 with c/o abdominal pain and N/V. Pt with elevated blood glurcose and diabetric ketoacidosis. UA (+) possible UTI. PMHx: HTN, CKDII, and valve replacement.  -VG     Existing Precautions/Restrictions  fall  -VG     Risks Reviewed  patient:;LOB;nausea/vomiting;dizziness;increased discomfort;change in vital signs;lines disloged  -VG     Benefits Reviewed  patient:;improve function;increase independence;increase strength;increase balance;decrease pain;decrease risk of DVT;improve skin integrity;increase knowledge  -VG     Barriers to Rehab  medically complex;previous functional deficit  -VG     Row Name 08/02/19 0930          Relationship/Environment    Primary Source of Support/Comfort  child(sridhar);extended family  -VG     Lives With  child(sridhar), adult  -VG     Concerns About Impact on Relationships  Pt lives with daughter at baseline. Granddaughter also available PRN for spv/assist.  -VG     Row Name 08/02/19 0930          Resource/Environmental Concerns    Current Living Arrangements  home/apartment/condo  -VG     Row Name 08/02/19 0930          Living Environment    Home Accessibility  wheelchair accessible  -VG     Row Name 08/02/19 0930          Cognitive Assessment/Interventions    Additional Documentation  Cognitive Assessment/Intervention (Group)  -VG     Row Name 08/02/19 0930          Cognitive Assessment/Intervention- PT/OT    Affect/Mental Status (Cognitive)  WFL  -VG     Orientation Status (Cognition)  oriented to;person;place;situation;time  -VG     Follows Commands (Cognition)  follows one step commands;over 90% accuracy  -VG     Cognitive Function (Cognitive)  safety deficit  -VG     Safety Deficit (Cognitive)  mild deficit;insight into deficits/self awareness  -VG     Personal Safety Interventions  fall prevention program maintained;gait belt;supervised activity;nonskid shoes/slippers when  out of bed  -VG     Row Name 08/02/19 0930          Bed Mobility Assessment/Treatment    Bed Mobility Assessment/Treatment  supine-sit;sit-supine  -VG     Supine-Sit [place] (Bed Mobility)  supervision;verbal cues  -VG     Sit-Supine [place] (Bed Mobility)  supervision;verbal cues  -VG     Bed Mobility, Safety Issues  decreased use of arms for pushing/pulling;decreased use of legs for bridging/pushing  -VG     Assistive Device (Bed Mobility)  bed rails;head of bed elevated  -VG     Comment (Bed Mobility)  Cues for hand placement and sequencing.  -VG     Row Name 08/02/19 0930          Transfer Assessment/Treatment    Transfer Assessment/Treatment  sit-stand transfer;stand-sit transfer;toilet transfer  -VG     Comment (Transfers)  Cues for hand placement and sequencing. Set up assist for latrell hygiene following toileting.  -VG     Sit-Stand [place] (Transfers)  contact guard;verbal cues  -VG     Stand-Sit [place] (Transfers)  contact guard;verbal cues  -VG     [place] Level (Toilet Transfer)  supervision;verbal cues  -VG     Assistive Device (Toilet Transfer)  commode, bedside without drop arms  -VG     Row Name 08/02/19 0930          Sit-Stand Transfer    Assistive Device (Sit-Stand Transfers)  walker, front-wheeled  -VG     Row Name 08/02/19 0930          Stand-Sit Transfer    Assistive Device (Stand-Sit Transfers)  walker, front-wheeled  -VG     Row Name 08/02/19 0930          Toilet Transfer    Type (Toilet Transfer)  sit-stand;stand-sit  -VG     Row Name 08/02/19 0930          Gait/Stairs Assessment/Training    Gait/Stairs Assessment/Training  gait/ambulation independence  -VG     [place] Level (Gait)  contact guard;verbal cues  -VG     Assistive Device (Gait)  walker, front-wheeled  -VG     Distance in Feet (Gait)  300  -VG     Pattern (Gait)  step-to;step-through  -VG     Deviations/Abnormal Patterns (Gait)  base of support, narrow;aissatou decreased;festinating/shuffling;gait speed  decreased;stride length decreased  -VG     Bilateral Gait Deviations  forward flexed posture  -VG     Comment (Gait/Stairs)  Distance limited by fatigue and weakness. Cues for upright posture and AD management. Demonstrates impaired endurance, balance, and strength.  -VG     Row Name 08/02/19 0930          General ROM    GENERAL ROM COMMENTS  BLEs WFL  -VG     Row Name 08/02/19 0930          MMT (Manual Muscle Testing)    General MMT Comments  BLEs 4+/5 grossly  -VG     Row Name 08/02/19 0930          Motor Assessment/Intervention    Additional Documentation  Balance (Group)  -VG     Row Name 08/02/19 0930          Balance    Balance  static sitting balance;static standing balance  -VG     Row Name 08/02/19 0930          Static Sitting Balance    Level of [place] (Unsupported Sitting, Static Balance)  supervision  -VG     Sitting Position (Unsupported Sitting, Static Balance)  sitting on edge of bed  -VG     Row Name 08/02/19 0930          Static Standing Balance    Level of [place] (Supported Standing, Static Balance)  supervision  -VG     Assistive Device Utilized (Supported Standing, Static Balance)  walker, rolling  -VG     Row Name 08/02/19 0930          Sensory Assessment/Intervention    Sensory General Assessment  no sensation deficits identified  -VG     Row Name 08/02/19 0930          Pain Assessment    Additional Documentation  Pain Scale: Numbers Pre/Post-Treatment (Group)  -VG     Row Name 08/02/19 0930          Pain Scale: Numbers Pre/Post-Treatment    Pain Scale: Numbers, Pretreatment  0/10 - no pain  -VG     Pain Scale: Numbers, Post-Treatment  0/10 - no pain  -VG     Row Name             Wound 08/01/19 0800 Right labia abrasion    Wound - Properties Group Date first assessed: 08/01/19  -TH Time first assessed: 0800  -TH Present On Admission : no  -TH Side: Right  -TH Location: labia  -TH Type: abrasion  -TH Additional Comments: WOC CONSULT PLACED   -TH    Row Name 08/02/19 0930           Coping    Observed Emotional State  accepting  -VG     Verbalized Emotional State  acceptance  -VG     Row Name 08/02/19 0930          Plan of Care Review    Plan of Care Reviewed With  patient  -VG     Row Name 08/02/19 0930          Physical Therapy Clinical Impression    Date of Referral to PT  08/01/19  -VG     PT Diagnosis (PT Clinical Impression)  Impaired endurance, balance, strength  -VG     Criteria for Skilled Interventions Met (PT Clinical Impression)  yes;treatment indicated  -VG     Pathology/Pathophysiology Noted (Describe Specifically for Each System)  musculoskeletal  -VG     Impairments Found (describe specific impairments)  aerobic capacity/endurance;gait, locomotion, and balance;muscle performance  -VG     Rehab Potential (PT Clinical Summary)  good, to achieve stated therapy goals  -VG     Care Plan Review (PT)  evaluation/treatment results reviewed;patient/other agree to care plan  -VG     Row Name 08/02/19 0930          Physical Therapy Goals    Bed Mobility Goal Selection (PT)  bed mobility, PT goal 1  -VG     Transfer Goal Selection (PT)  transfer, PT goal 1  -VG     Gait Training Goal Selection (PT)  gait training, PT goal 1  -VG     Row Name 08/02/19 0930          Bed Mobility Goal 1 (PT)    Activity/Assistive Device (Bed Mobility Goal 1, PT)  sit to supine/supine to sit  -VG     [place] Level/Cues Needed (Bed Mobility Goal 1, PT)  independent  -VG     Time Frame (Bed Mobility Goal 1, PT)  long term goal (LTG);2 weeks  -VG     Row Name 08/02/19 0930          Transfer Goal 1 (PT)    Activity/Assistive Device (Transfer Goal 1, PT)  sit-to-stand/stand-to-sit;walker, rolling  -VG     [place] Level/Cues Needed (Transfer Goal 1, PT)  conditional independence  -VG     Time Frame (Transfer Goal 1, PT)  long term goal (LTG);2 weeks  -VG     Row Name 08/02/19 0930          Gait Training Goal 1 (PT)    Activity/Assistive Device (Gait Training Goal 1, PT)  gait (walking locomotion);assistive  device use;walker, rolling  -VG     [place] Level (Gait Training Goal 1, PT)  conditional independence  -VG     Distance (Gait Goal 1, PT)  500  -VG     Time Frame (Gait Training Goal 1, PT)  long term goal (LTG);2 weeks  -VG     Row Name 08/02/19 0930          Patient Education Goal (PT)    Activity (Patient Education Goal, PT)  HEP  -VG     [place]/Cues/Accuracy (Memory Goal 2, PT)  independent  -VG     Time Frame (Patient Education Goal, PT)  long term goal (LTG);2 weeks  -VG     Row Name 08/02/19 0930          Positioning and Restraints    Pre-Treatment Position  in bed  -VG     Post Treatment Position  bed  -VG     In Bed  fowlers;call light within reach;encouraged to call for assist;exit alarm on;side rails up x2  -VG       User Key  (r) = Recorded By, (t) = Taken By, (c) = Cosigned By    Initials Name Provider Type     Felicitas Skelton, RN Registered Nurse    Didi Sow, PT Physical Therapist        Physical Therapy Education     Title: PT OT SLP Therapies (In Progress)     Topic: Physical Therapy (In Progress)     Point: Mobility training (Done)     Learning Progress Summary           Patient Acceptance, E, VU by VG at 8/2/2019  9:30 AM    Comment:  Educated on correct mechanics for safe functional mobility. Discussed d/c planning/recommendations and PT POC.                   Point: Body mechanics (Done)     Learning Progress Summary           Patient Acceptance, E, VU by VG at 8/2/2019  9:30 AM    Comment:  Educated on correct mechanics for safe functional mobility. Discussed d/c planning/recommendations and PT POC.                   Point: Precautions (Done)     Learning Progress Summary           Patient Acceptance, E, VU by VG at 8/2/2019  9:30 AM    Comment:  Educated on correct mechanics for safe functional mobility. Discussed d/c planning/recommendations and PT POC.                               User Key     Initials Effective Dates Name Provider Type [facility] 05/29/18  -  Didi Turner, PT Physical Therapist PT              PT Recommendation and Plan  Anticipated Discharge Disposition (PT): home with assist, home with home health  Planned Therapy Interventions (PT Eval): balance training, bed mobility training, gait training, home exercise program, patient/family education, strengthening, transfer training  Therapy Frequency (PT Clinical Impression): daily  Outcome Summary/Treatment Plan (PT)  Anticipated Discharge Disposition (PT): home with assist, home with home health  Plan of Care Reviewed With: patient  Outcome Summary: IP PT eval completed. Pt ambulated 300 ft with RW and CGA, limited by fatigue and weakness. Demonstrates impaired endurance, balance, strength. Recommend appropriate for home with HH PT upon d/c. Will continue to progress pt as able per POC.  Outcome Measures     Row Name 08/02/19 0930             How much help from another person do you currently need...    Turning from your back to your side while in flat bed without using bedrails?  4  -VG      Moving from lying on back to sitting on the side of a flat bed without bedrails?  4  -VG      Moving to and from a bed to a chair (including a wheelchair)?  3  -VG      Standing up from a chair using your arms (e.g., wheelchair, bedside chair)?  3  -VG      Climbing 3-5 steps with a railing?  2  -VG      To walk in hospital room?  3  -VG      AM-PAC 6 Clicks Score (PT)  19  -VG         Functional Assessment    Outcome Measure Options  AM-PAC 6 Clicks Basic Mobility (PT)  -VG        User Key  (r) = Recorded By, (t) = Taken By, (c) = Cosigned By    Initials Name Provider Type    VG Didi Turner, PT Physical Therapist         Time Calculation:   PT Charges     Row Name 08/02/19 0930             Time Calculation    Start Time  0930  -VG      PT Received On  08/02/19  -VG      PT Goal Re-Cert Due Date  08/12/19  -VG         Time Calculation- PT    Total Timed Code Minutes- PT  15 minute(s)  -VG         Timed  Charges    02087 - Gait Training Minutes   15  -VG        User Key  (r) = Recorded By, (t) = Taken By, (c) = Cosigned By    Initials Name Provider Type    VG Didi Turner, PT Physical Therapist        Therapy Charges for Today     Code Description Service Date Service Provider Modifiers Qty    [id number]  GAIT TRAINING EA 15 MIN 8/2/2019 Didi Turner, PT GP 1    [id number] HC PT EVAL LOW COMPLEXITY 4 8/2/2019 Didi Turner, PT GP 1          PT G-Codes  Outcome Measure Options: AM-PAC 6 Clicks Basic Mobility (PT)  AM-PAC 6 Clicks Score (PT): 19      Almaz Turner, PT  8/2/2019

## 2019-08-02 NOTE — PROGRESS NOTES
[facility] Medicine Services  PROGRESS NOTE    Patient Name: Kasia Stewart  : 1955  MRN: [id number]    Date of Admission: 2019  Length of Stay: 7  Primary Care Physician: Melissa Cabrales MD    Subjective   Subjective     CC:  N/V, severe weakness    HPI:  Had episode of hypoglycemia last night but this morning she is fine.  Resting in bed in no acute distress and overall feels better.  No abdominal pain or nausea vomiting. Diarrhea has improved.   No fever or chills.  No chest pain, palpitation, shortness of breath at rest.    Review of Systems      Otherwise ROS is negative except as mentioned in the HPI.    Objective   Objective     Vital Signs:   Temp:  [97.7 °F (36.5 °C)-98.1 °F (36.7 °C)] 97.7 °F (36.5 °C)  Heart Rate:  [65-78] 78  Resp:  [16-18] 18  BP: (107-153)/(45-65) 131/45        Physical Exam:  Constitutional: Awake, alert  Eyes: PERRLA, sclerae anicteric, no conjunctival injection  HENT: NCAT, mucous membranes moist  Neck: Supple, no thyromegaly, no lymphadenopathy, trachea midline  Respiratory: Clear to auscultation bilaterally, nonlabored respirations   Cardiovascular: RRR, no murmurs, rubs, or gallops.  Gastrointestinal: Positive bowel sounds, soft, nontender, nondistended  Musculoskeletal: Low muscle mass, severe bilateral ankle edema. 6 cm diameter hematoma on ventral aspect of left forearm.  Psychiatric: Appropriate affect, cooperative  Neurologic:A,A, follows commands, Cranial Nerves grossly intact to confrontation, speech clear  Skin: No rashes    Results Reviewed:  I have personally reviewed current lab, radiology, and data and agree.    Results from last 7 days   Lab Units 19  0519 19  0924 19  0451 19  0359 19  2036   WBC 10*3/mm3  --  4.98  --  3.92 4.95   HEMOGLOBIN g/dL 8.9* 8.9*  --  8.3* 9.4*   HEMATOCRIT % 28.5* 28.0*  --  27.2* 30.2*   PLATELETS 10*3/mm3  --  240  --  145 236   INR  2.06* 1.80* 1.76* 1.31*   --      Results from last 7 days   Lab Units 08/01/19  0924 07/30/19  0359 07/29/19 2036   SODIUM mmol/L 138 134* 137   POTASSIUM mmol/L 4.5 4.7 4.7   CHLORIDE mmol/L 103 105 103   CO2 mmol/L 21.0* 21.0* 23.0   BUN mg/dL 39* 43* 40*   CREATININE mg/dL 1.52* 1.34* 1.46*   GLUCOSE mg/dL 135* 199* 158*   CALCIUM mg/dL 8.7 8.3* 8.5*   ALT (SGPT) U/L 14 16 19   AST (SGOT) U/L 19 24 26   TROPONIN T ng/mL  --   --  0.017     Estimated Creatinine Clearance: 35 mL/min (A) (by C-G formula based on SCr of 1.52 mg/dL (H)).    Microbiology Results Abnormal     Procedure Component Value - Date/Time    Blood Culture - Blood, Arm, Left [[id number]]  (Abnormal) Collected:  07/26/19 1550    Lab Status:  Final result Specimen:  Blood from Arm, Left Updated:  07/29/19 0602     Blood Culture Escherichia coli     Comment: Refer to previous blood culture collected on 7/26/2019 1540 for MICs          Gram Stain Aerobic Bottle Gram negative bacilli      Anaerobic Bottle Gram negative bacilli    Blood Culture - Blood, Hand, Right [[id number]]  (Abnormal)  (Susceptibility) Collected:  07/26/19 1540    Lab Status:  Final result Specimen:  Blood from Hand, Right Updated:  07/29/19 0559     Blood Culture Escherichia coli     Gram Stain Aerobic Bottle Gram negative bacilli      Anaerobic Bottle Gram negative bacilli    Susceptibility      Escherichia coli     EMANUEL     Ampicillin Susceptible     Ampicillin + Sulbactam Susceptible     Cefazolin Susceptible     Cefepime Susceptible     Ceftazidime Susceptible     Ceftriaxone Susceptible     Gentamicin Susceptible     Levofloxacin Susceptible     Piperacillin + Tazobactam Susceptible     Trimethoprim + Sulfamethoxazole Susceptible                    Urine Culture - Urine, Urine, Clean Catch [[id number]]  (Abnormal)  (Susceptibility) Collected:  07/26/19 2125    Lab Status:  Final result Specimen:  Urine, Clean Catch Updated:  07/28/19 1144     Urine Culture >100,000 CFU/mL Escherichia coli     Susceptibility      Escherichia coli     EMANUEL     Ampicillin Susceptible     Ampicillin + Sulbactam Susceptible     Cefazolin Susceptible     Cefepime Susceptible     Ceftazidime Susceptible     Ceftriaxone Susceptible     Gentamicin Susceptible     Levofloxacin Susceptible     Nitrofurantoin Susceptible     Piperacillin + Tazobactam Susceptible     Tetracycline Susceptible     Trimethoprim + Sulfamethoxazole Susceptible                    Blood Culture ID, PCR - Blood, Hand, Right [[id number]]  (Abnormal) Collected:  07/26/19 1540    Lab Status:  Final result Specimen:  Blood from Hand, Right Updated:  07/27/19 0885     BCID, PCR Escherichia coli. Identification by BCID PCR.          Imaging Results (last 24 hours)     ** No results found for the last 24 hours. **               I have reviewed the medications:  Scheduled Meds:    allopurinol 100 mg Oral Daily   aspirin 81 mg Oral Daily   atorvastatin 20 mg Oral Daily   budesonide-formoterol 2 puff Inhalation BID - RT   carvedilol 6.25 mg Oral BID With Meals   cetirizine 5 mg Oral Daily   ferrous sulfate 325 mg Oral Daily With Breakfast   insulin detemir 10 Units Subcutaneous Q12H   insulin lispro 0-9 Units Subcutaneous 4x Daily With Meals & Nightly   insulin lispro 7 Units Subcutaneous TID With Meals   isosorbide mononitrate 30 mg Oral Daily   levothyroxine 150 mcg Oral Q AM   pantoprazole 40 mg Oral Daily   piperacillin-tazobactam 3.375 g Intravenous Q8H   saccharomyces boulardii 250 mg Oral Daily   sodium chloride 3 mL Intravenous Q12H   warfarin 3.75 mg Oral Once per day on Sun Mon Wed Fri   warfarin 7.5 mg Oral Once per day on Tue Thu Sat     Continuous Infusions:    dextrose 5 % and sodium chloride 0.45 % 250 mL/hr    dextrose 5 % and sodium chloride 0.45 % with KCl 20 mEq/L 250 mL/hr    dextrose 5 % and sodium chloride 0.45 % with KCl 40 mEq/L 250 mL/hr    Pharmacy to dose warfarin     custom IV KCl infusion builder 250 mL/hr    sodium chloride 250 mL/hr     sodium chloride 0.45 % with KCl 20 mEq 250 mL/hr    sodium chloride 30 mL/hr    sodium chloride 250 mL/hr    sodium chloride 10 mL/hr    sodium chloride 0.9 % with KCl 20 mEq 250 mL/hr Last Rate: Stopped (07/27/19 0326)   sodium chloride 0.9 % with KCl 40 mEq/L 250 mL/hr      PRN Meds:.•  acetaminophen  •  acetaminophen  •  bisacodyl  •  calcium carbonate  •  dextrose  •  dextrose  •  dextrose  •  dextrose 5 % and sodium chloride 0.45 %  •  dextrose 5 % and sodium chloride 0.45 % with KCl 20 mEq/L  •  dextrose 5 % and sodium chloride 0.45 % with KCl 40 mEq/L  •  fluticasone  •  glucagon (human recombinant)  •  loperamide  •  ondansetron  •  Pharmacy to dose warfarin  •  custom IV KCl infusion builder  •  sodium chloride  •  sodium chloride 0.45 % with KCl 20 mEq  •  sodium chloride  •  sodium chloride  •  sodium chloride  •  sodium chloride  •  sodium chloride  •  sodium chloride  •  sodium chloride  •  sodium chloride  •  sodium chloride 0.9 % with KCl 20 mEq  •  sodium chloride 0.9 % with KCl 40 mEq/L      Assessment/Plan   Assessment / Plan     Active Hospital Problems    Diagnosis  POA   • **Diabetic ketoacidosis without coma associated with type 2 diabetes mellitus (CMS/HCC) [E11.10]  Yes   • History of heart valve replacement [Z95.2]  Not Applicable   • Chronic anticoagulation [Z79.01]  Not Applicable   • Hypertension [I10]  Yes   • CKD (chronic kidney disease), stage II [N18.2]  Yes   • UTI (urinary tract infection), bacterial [N39.0, A49.9]  Yes      Resolved Hospital Problems   No resolved problems to display.        Brief Hospital Course to date:  Kasia Stewart is a 64 y.o. female with past medical history significant for diabetes mellitus, chronic kidney disease, hypertension, multiple hospitalization for nausea vomiting secondary to diabetic ketoacidosis.  Patient was admitted for nausea vomiting and severe weakness secondary to hyperglycemia and diabetic ketoacidosis.  Patient also was febrile  and blood cultures showed gram-negative srinivas bacteremia.    Assessment:    *Hyperglycemia and diabetic ketoacidosis.  Ketoacidosis has resolved after insulin drip and  fluid resuscitation.    *Nausea, vomiting, abdominal pain secondary to ketoacidosis.  Family tells me that she has had EGD done recently at [facility] however we have not been able to obtain the results.    *E. coli UTI and bacteremia.  Patient currently is on meropenem and remains afebrile.    *Sepsis with leukocytosis and fever secondary to above resolved.    *Dehydration secondary to hyperglycemia resolved with IV fluid replacement.    *Severe and chronic edema of lower extremities, left more than right. Bilateral duplex was negative for DVT.    *Valvular heart disease status post aortic valve replacement and patient is on chronic Coumadin therapy.    * left arm hematoma, monitor.    *  protein malnutrition with low albumin and low total protein.    PLAN:    - cont current care but DC Levemir at night.  -Labs in a.m.  -Monitor the left arm hematoma  -Home when she is a little more stable      DVT Prophylaxis: Anticoagulated    Disposition: I expect the patient to be discharged home in a few days.    CODE STATUS:   Code Status and Medical Interventions:   Ordered at: 07/26/19 9160     Code Status:    CPR     Medical Interventions (Level of Support Prior to Arrest):    Full         Electronically signed by Regino Watt MD, 08/02/19, 3:30 PM.

## 2019-08-03 ENCOUNTER — APPOINTMENT (OUTPATIENT)
Dept: ULTRASOUND IMAGING | Facility: HOSPITAL | Age: 64
End: 2019-08-03

## 2019-08-03 PROBLEM — N18.9 ACUTE-ON-CHRONIC KIDNEY INJURY (HCC): Status: ACTIVE | Noted: 2019-08-03

## 2019-08-03 PROBLEM — D63.8 ANEMIA, CHRONIC DISEASE: Status: ACTIVE | Noted: 2019-08-03

## 2019-08-03 PROBLEM — N17.9 ACUTE-ON-CHRONIC KIDNEY INJURY (HCC): Status: ACTIVE | Noted: 2019-08-03

## 2019-08-03 LAB
ANION GAP SERPL CALCULATED.3IONS-SCNC: 15 MMOL/L (ref 5–15)
BUN BLD-MCNC: 44 MG/DL (ref 8–23)
BUN/CREAT SERPL: 24.3 (ref 7–25)
CALCIUM SPEC-SCNC: 8.7 MG/DL (ref 8.6–10.5)
CHLORIDE SERPL-SCNC: 100 MMOL/L (ref 98–107)
CO2 SERPL-SCNC: 19 MMOL/L (ref 22–29)
CREAT BLD-MCNC: 1.81 MG/DL (ref 0.57–1)
GFR SERPL CREATININE-BSD FRML MDRD: 34 ML/MIN/1.73
GLUCOSE BLD-MCNC: 282 MG/DL (ref 65–99)
GLUCOSE BLDC GLUCOMTR-MCNC: 163 MG/DL (ref 70–130)
GLUCOSE BLDC GLUCOMTR-MCNC: 188 MG/DL (ref 70–130)
GLUCOSE BLDC GLUCOMTR-MCNC: 275 MG/DL (ref 70–130)
GLUCOSE BLDC GLUCOMTR-MCNC: 295 MG/DL (ref 70–130)
HCT VFR BLD AUTO: 25.3 % (ref 34–46.6)
HGB BLD-MCNC: 7.8 G/DL (ref 12–15.9)
INR PPP: 2.02 (ref 0.85–1.16)
POTASSIUM BLD-SCNC: 5 MMOL/L (ref 3.5–5.2)
PROTHROMBIN TIME: 22 SECONDS (ref 11.2–14.3)
SODIUM BLD-SCNC: 134 MMOL/L (ref 136–145)

## 2019-08-03 PROCEDURE — 85610 PROTHROMBIN TIME: CPT | Performed by: FAMILY MEDICINE

## 2019-08-03 PROCEDURE — 63710000001 INSULIN DETEMIR PER 5 UNITS: Performed by: INTERNAL MEDICINE

## 2019-08-03 PROCEDURE — 85018 HEMOGLOBIN: CPT | Performed by: INTERNAL MEDICINE

## 2019-08-03 PROCEDURE — 80048 BASIC METABOLIC PNL TOTAL CA: CPT | Performed by: INTERNAL MEDICINE

## 2019-08-03 PROCEDURE — 76775 US EXAM ABDO BACK WALL LIM: CPT

## 2019-08-03 PROCEDURE — 25010000002 ONDANSETRON PER 1 MG: Performed by: FAMILY MEDICINE

## 2019-08-03 PROCEDURE — 94799 UNLISTED PULMONARY SVC/PX: CPT

## 2019-08-03 PROCEDURE — 85014 HEMATOCRIT: CPT | Performed by: INTERNAL MEDICINE

## 2019-08-03 PROCEDURE — 25010000002 PIPERACILLIN SOD-TAZOBACTAM PER 1 G: Performed by: INTERNAL MEDICINE

## 2019-08-03 PROCEDURE — 94640 AIRWAY INHALATION TREATMENT: CPT

## 2019-08-03 PROCEDURE — 82962 GLUCOSE BLOOD TEST: CPT

## 2019-08-03 PROCEDURE — 99233 SBSQ HOSP IP/OBS HIGH 50: CPT | Performed by: NURSE PRACTITIONER

## 2019-08-03 PROCEDURE — 63710000001 INSULIN DETEMIR PER 5 UNITS: Performed by: NURSE PRACTITIONER

## 2019-08-03 RX ORDER — SODIUM CHLORIDE 9 MG/ML
75 INJECTION, SOLUTION INTRAVENOUS CONTINUOUS
Status: ACTIVE | OUTPATIENT
Start: 2019-08-03 | End: 2019-08-03

## 2019-08-03 RX ADMIN — INSULIN LISPRO 2 UNITS: 100 INJECTION, SOLUTION INTRAVENOUS; SUBCUTANEOUS at 17:54

## 2019-08-03 RX ADMIN — BUDESONIDE AND FORMOTEROL FUMARATE DIHYDRATE 2 PUFF: 80; 4.5 AEROSOL RESPIRATORY (INHALATION) at 08:50

## 2019-08-03 RX ADMIN — ATORVASTATIN CALCIUM 20 MG: 20 TABLET, FILM COATED ORAL at 08:40

## 2019-08-03 RX ADMIN — INSULIN LISPRO 7 UNITS: 100 INJECTION, SOLUTION INTRAVENOUS; SUBCUTANEOUS at 08:40

## 2019-08-03 RX ADMIN — LEVOTHYROXINE SODIUM 150 MCG: 150 TABLET ORAL at 05:15

## 2019-08-03 RX ADMIN — BUDESONIDE AND FORMOTEROL FUMARATE DIHYDRATE 2 PUFF: 80; 4.5 AEROSOL RESPIRATORY (INHALATION) at 20:08

## 2019-08-03 RX ADMIN — CARVEDILOL 6.25 MG: 12.5 TABLET, FILM COATED ORAL at 08:40

## 2019-08-03 RX ADMIN — ALLOPURINOL 100 MG: 100 TABLET ORAL at 08:41

## 2019-08-03 RX ADMIN — INSULIN LISPRO 7 UNITS: 100 INJECTION, SOLUTION INTRAVENOUS; SUBCUTANEOUS at 17:54

## 2019-08-03 RX ADMIN — WARFARIN SODIUM 7.5 MG: 7.5 TABLET ORAL at 17:54

## 2019-08-03 RX ADMIN — INSULIN DETEMIR 10 UNITS: 100 INJECTION, SOLUTION SUBCUTANEOUS at 08:39

## 2019-08-03 RX ADMIN — TAZOBACTAM SODIUM AND PIPERACILLIN SODIUM 3.38 G: 375; 3 INJECTION, SOLUTION INTRAVENOUS at 15:06

## 2019-08-03 RX ADMIN — INSULIN LISPRO 6 UNITS: 100 INJECTION, SOLUTION INTRAVENOUS; SUBCUTANEOUS at 08:40

## 2019-08-03 RX ADMIN — PANTOPRAZOLE SODIUM 40 MG: 40 TABLET, DELAYED RELEASE ORAL at 08:40

## 2019-08-03 RX ADMIN — INSULIN DETEMIR 6 UNITS: 100 INJECTION, SOLUTION SUBCUTANEOUS at 20:46

## 2019-08-03 RX ADMIN — INSULIN LISPRO 7 UNITS: 100 INJECTION, SOLUTION INTRAVENOUS; SUBCUTANEOUS at 11:54

## 2019-08-03 RX ADMIN — ONDANSETRON 4 MG: 2 INJECTION INTRAMUSCULAR; INTRAVENOUS at 02:56

## 2019-08-03 RX ADMIN — FERROUS SULFATE TAB 325 MG (65 MG ELEMENTAL FE) 325 MG: 325 (65 FE) TAB at 08:40

## 2019-08-03 RX ADMIN — INSULIN LISPRO 6 UNITS: 100 INJECTION, SOLUTION INTRAVENOUS; SUBCUTANEOUS at 11:54

## 2019-08-03 RX ADMIN — Medication 250 MG: at 08:40

## 2019-08-03 RX ADMIN — ISOSORBIDE MONONITRATE 30 MG: 30 TABLET, EXTENDED RELEASE ORAL at 08:40

## 2019-08-03 RX ADMIN — SODIUM CHLORIDE 75 ML/HR: 9 INJECTION, SOLUTION INTRAVENOUS at 06:43

## 2019-08-03 RX ADMIN — TAZOBACTAM SODIUM AND PIPERACILLIN SODIUM 3.38 G: 375; 3 INJECTION, SOLUTION INTRAVENOUS at 22:32

## 2019-08-03 RX ADMIN — INSULIN LISPRO 2 UNITS: 100 INJECTION, SOLUTION INTRAVENOUS; SUBCUTANEOUS at 20:46

## 2019-08-03 RX ADMIN — ASPIRIN 81 MG: 81 TABLET, COATED ORAL at 08:40

## 2019-08-03 RX ADMIN — TAZOBACTAM SODIUM AND PIPERACILLIN SODIUM 3.38 G: 375; 3 INJECTION, SOLUTION INTRAVENOUS at 06:12

## 2019-08-03 NOTE — NURSING NOTE
Pt states she has urinary frequency and when she urinates, it is a small amount. Pt is on antibiotics for UTI. Bladder scan revealed 46 ml in bladder. Advised pt to drink more water and we'd f/u in am w/MD

## 2019-08-03 NOTE — PROGRESS NOTES
Adult Nutrition  Assessment/PES    Patient Name:  Kasia Stewart  YOB: 1955  MRN: [id number]  Admit Date:  7/26/2019    Assessment Date:  8/3/2019    Comments:  Pt instructed in diet for DM emphasizing spacing intake and spreading CHO intake through the day. Voices understanding. Declares can follow this. Supplement changed to preferred product used at home.     Reason for Assessment     Row Name 08/03/19 1819          Reason for Assessment    Reason For Assessment  follow-up protocol;other (see comments) Education request per pt 40 min     Diagnosis  diabetes diagnosis/complications;infection/sepsis Pt adm w/ DKA< UTI, SHAINA on CKD-stage 2 , abd pain Hx: HTN, DM-T2, CK, heart valve replacement         Nutrition/Diet History     Row Name 08/03/19 1820          Nutrition/Diet History    Factors Affecting Nutritional Intake  other (see comments) Pt rpt she drinks Glucerna at home. Allows ok w food in hospital.  References feeling weak.           Labs/Tests/Procedures/Meds     Row Name 08/03/19 1821          Labs/Procedures/Meds    Lab Results Reviewed  reviewed             Nutrition Prescription Ordered     Row Name 08/03/19 1822          Nutrition Prescription PO    Current PO Diet  Regular     Supplement  Ensure HP at time of visit     Supplement Frequency  3 times a day     Common Modifiers  Cardiac;Consistent Carbohydrate         Evaluation of Received Nutrient/Fluid Intake     Row Name 08/03/19 1822          PO Evaluation    Number of Meals  10     % PO Intake  70               Problem/Interventions:  Problem 1     Row Name 08/03/19 1822          Nutrition Diagnoses Problem 1    Problem 1  Unintended Weight Loss     Etiology (related to)  Factors Affecting Nutrition     Appetite  Poor     Signs/Symptoms (evidenced by)  Unintended Weight Change     Unintended Weight Change  Loss     Number of Pounds Lost  20     Weight loss time period  1 yr - 11%         Problem 2     Row Name 08/03/19 1823           Nutrition Diagnoses Problem 2    Problem 2  Knowledge Deficit     Etiology (related to)  Medical Diagnosis     Endocrine  DM Type 2     Signs/Symptoms (evidenced by)  Reported Information Deficit     Resolved?  Yes               Intervention Goal     Row Name 08/03/19 1823          Intervention Goal    General  Nutrition support treatment;Provide information regarding MNT for treatment/condition     PO  Maintain intake         Nutrition Intervention     Row Name 08/03/19 1824          Nutrition Intervention    RD/Tech Action  Adjusted supplement;Advise alternate selection;Menu provided to pt preferenced         Nutrition Prescription     Row Name 08/03/19 1824          Nutrition Prescription PO    Supplement  Boost Glucose Control     Supplement Frequency  2 times a day     New PO Prescription Ordered?  Yes         Education/Evaluation     Row Name 08/03/19 1824          Education    Education  Provided education regarding;Advised regarding habits/behavior     Provided education regarding  Diet rationale;Key food habit change     Education Topics  Cardiac diabetic;Glucose;CHO counting     Advised Regarding Habits/Behavior  Appropriate portions;Food choices;Eating pattern        Monitor/Evaluation    Monitor  Per protocol;PO intake;Supplement intake;Pertinent labs;Symptoms     Education Follow-up  Other (comment) Pt allows this is best explanation she has had of diet.  Voices understanding of principles. Proof will be in future performance.           Electronically signed by:  Chani Doss RD  08/03/19 6:27 PM

## 2019-08-03 NOTE — PLAN OF CARE
Problem: Fall Risk (Adult)  Goal: Identify Related Risk Factors and Signs and Symptoms  Outcome: Ongoing (interventions implemented as appropriate)   08/03/19 0402   Fall Risk (Adult)   Related Risk Factors (Fall Risk) history of falls   Signs and Symptoms (Fall Risk) presence of risk factors     Goal: Absence of Fall  Outcome: Ongoing (interventions implemented as appropriate)   08/03/19 0402   Fall Risk (Adult)   Absence of Fall making progress toward outcome       Problem: Patient Care Overview  Goal: Plan of Care Review  Outcome: Ongoing (interventions implemented as appropriate)   08/03/19 0402   Coping/Psychosocial   Plan of Care Reviewed With patient   Plan of Care Review   Progress improving       Problem: Hyperglycemia, Persistent (Adult)  Goal: Signs and Symptoms of Listed Potential Problems Will be Absent, Minimized or Managed (Hyperglycemia, Persistent)  Outcome: Ongoing (interventions implemented as appropriate)   08/03/19 0402   Goal/Outcome Evaluation   Problems Assessed (Hyperglycemia) all   Problems Present (Hyperglycemia) hyperglycemia

## 2019-08-03 NOTE — PROGRESS NOTES
"Pharmacy Consult  -  Warfarin    Kasia Stewart is a  64 y.o. female   Height - 157.5 cm (62\")  Weight - 74.8 kg (165 lb)    Consulting Provider: - Hospitalist  Indication: - aortic Mechanical Valve  Goal INR: - 2-3  Home Regimen:   - Warfarin 3.75mg Sunday, Monday, Wednesday, Friday             - Warfarin 7.5mg Tuesday, Thursday, Saturday    Bridge Therapy: enoxaparin bridge d/c'ed 8/2    Drug-Drug Interactions with current regimen:   Aspirin - increased bleeding risk              Levothyroxine - increased bleeding risk              Pantoprazole - may increase INR    Warfarin Dosing During Admission:    Date  7/26 7/27 7/28 7/29 7/30 7/31 8/1 8/2 8/3   INR  2.63 3.47 3.47 2.11 1.31 1.76 1.8 2.06 2.02   Dose  3.75 Hold dose Hold dose 7.5mg 7.5mg  3.75mg 7.5mg 3.75 (7.5mg)       Education Provided: Patient is on warfarin prior to admission.  Education provided 7/29 verbally and in witing.  Discussed effects of warfarin, importance of checking INR, drug-drug and drug-food interactions, and signs/symptoms of bleeding and clotting.  Patient verbalized understanding through teach back.  All pertinent questions were answered.        Discharge Follow up:   Following Provider - Dr. Abdullahi White   Follow up time range or appointment - 2-3 days following discharge      Labs:    Results from last 7 days   Lab Units 08/03/19  0545 08/02/19  0519 08/01/19  0924 07/31/19  0451 07/30/19  0359 07/29/19  2036 07/29/19  0737 07/28/19  0426   INR  2.02* 2.06* 1.80* 1.76* 1.31*  --  2.11* 3.47*   HEMOGLOBIN g/dL 7.8* 8.9* 8.9*  --  8.3* 9.4*  --  8.5*   HEMATOCRIT % 25.3* 28.5* 28.0*  --  27.2* 30.2*  --  26.6*   PLATELETS 10*3/mm3  --   --  240  --  145 236  --  235     Results from last 7 days   Lab Units 08/03/19  0545 08/01/19  0924 07/30/19  0359 07/29/19 2036   SODIUM mmol/L 134* 138 134* 137   POTASSIUM mmol/L 5.0 4.5 4.7 4.7   CHLORIDE mmol/L 100 103 105 103   CO2 mmol/L 19.0* 21.0* 21.0* 23.0   BUN mg/dL 44* 39* 43* " 40*   CREATININE mg/dL 1.81* 1.52* 1.34* 1.46*   CALCIUM mg/dL 8.7 8.7 8.3* 8.5*   BILIRUBIN mg/dL  --  0.2 0.2 0.2   ALK PHOS U/L  --  92 87 105   ALT (SGPT) U/L  --  14 16 19   AST (SGOT) U/L  --  19 24 26   GLUCOSE mg/dL 282* 135* 199* 158*       Current dietary intake: % of documented meals  Diet Order   Procedures   • Diet Regular; Consistent Carbohydrate, Cardiac         Assessment/Plan:     Warfarin being dosed for mechanical aortic valve with INR goal 2-3.     1. Patient's INR is therapeutic today at 2.02. Represents a slight decrease in INR when compared to yesterday.  2. Will give warfarin 7.5 mg today.  3. Daily PT/INR ordered.  4. Monitor signs/symptoms of bleeding, dietary intake, and drug-drug interactions. Make dose adjustments as necessary.  5. Pharmacy will continue to follow.    Thanks,  Kal Kelly PharmD, JATINDER  Pharmacy Resident  8/3/2019  11:05 AM

## 2019-08-03 NOTE — PROGRESS NOTES
[facility] Progress Note        Antibiotics:  Anti-Infectives (From admission, onward)    Ordered     Dose/Rate Route Frequency Start Stop    07/29/19 0733  piperacillin-tazobactam (ZOSYN) 3.375 g in iso-osmotic dextrose 50 ml (premix)     Ordering Provider:  Luis Miller MD    3.375 g  over 4 Hours Intravenous Every 8 Hours 07/29/19 1430 08/08/19 1429    07/29/19 0733  piperacillin-tazobactam (ZOSYN) 3.375 g in iso-osmotic dextrose 50 ml (premix)     Ordering Provider:  Luis Miller MD    3.375 g  over 30 Minutes Intravenous Once 07/29/19 0830 07/29/19 0920    07/27/19 0853  meropenem (MERREM) 1 g/100 mL 0.9% NS VTB (mbp)     Ordering Provider:  Luis Miller MD    1 g  over 30 Minutes Intravenous Once 07/27/19 0945 07/27/19 1037          CC: abd pain    HPI:    Patient is a 64 y.o.  Yr old female with history of diabetes mellitus, history of valve replacement on chronic anticoagulation with Coumadin, chronic kidney disease described as stage II by internal medicine.  She was admitted to [facility] July 26 with acute onset nausea/vomiting, generalized fatigue, dysuria and abdominal pain.  Subsequent fever exceeding 103 and blood culture positivity for GNR.     8/3/19 needed to strain with urination, some dribbling but no other new urinary complaints.; generally fatigued and feels debilitated;  No abd pain.  She  had nausea/vomiting, now better and  denies diarrhea.  No hematochezia melena or hematemesis.  No jaundice.     No dysuria ,   No hematuria or pyuria and currently denies flank pain.       No headache photophobia or neck stiffness.  Denies cough or hemoptysis.  No skin rash.         ROS:      8/3/19 No f/c/s. No n/v/d. No rash. No new ADR to Abx.       Constitutional-- generally fatigued with malaise and poor appetite  Heent-- No new vision, hearing or throat complaints.  No epistaxis or oral sores.  Denies odynophagia or dysphagia.  No flashers, floaters or eye pain. No  "odynophagia or dysphagia. No headache, photophobia or neck stiffness.  CV-- No chest pain, palpitation or syncope  Resp-- No SOB/cough/Hemoptysis  GI- -- No dysuria, hematuria, or flank pain.  Denies  flank pain.  Lymph- no swollen lymph nodes in neck/axilla or groin.   Heme- No active bruising or bleeding; no Hx of DVT or PE.  MS-- no swelling or pain in the bones or joints of arms/legs.  No new back pain.  Neuro-- No acute focal weakness or numbness in the arms or legs.  No seizures.     Full 12 point review of systems reviewed and negative otherwise for acute complaints, except for above          PE:   /72 (BP Location: Left arm, Patient Position: Lying)   Pulse 81   Temp 98.7 °F (37.1 °C) (Oral)   Resp 16   Ht 157.5 cm (62\")   Wt 74.8 kg (165 lb)   SpO2 99%   BMI 30.18 kg/m²       GENERAL:sleepy, in no acute distress.  Appears chronically ill, nasal cannula oxygen  HEENT: Normocephalic, atraumatic.  PERRL. EOMI. No conjunctival injection. No icterus. Oropharynx clear without evidence of thrush or exudate. No evidence of peridontal disease.    NECK: Supple without nuchal rigidity. No mass.  LYMPH: No cervical, axillary or inguinal lymphadenopathy.  HEART: RRR; No murmur, rubs, gallops.   LUNGS: Diminished at bases bilaterally without wheezing, rales, rhonchi. Normal respiratory effort. Nonlabored. No dullness.  ABDOMEN: Soft,less tender, nondistended. Positive bowel sounds. No rebound or guarding. NO mass or HSM.  EXT:  No cyanosis, clubbing or edema. No cord.  : Genitalia generally unremarkable.  Without Horton catheter.  MSK: FROM without joint effusions noted arms/legs.    SKIN: Warm and dry without cutaneous eruptions on Inspection/palpation.    NEURO: sleepy     No peripheral stigmata/phenomena of endocarditis     No CVA tenderness    Laboratory Data    Results from last 7 days   Lab Units 08/03/19  0545 08/02/19  0519 08/01/19  0924 07/30/19  0359 07/29/19  2036   WBC 10*3/mm3  --   --  4.98 " 3.92 4.95   HEMOGLOBIN g/dL 7.8* 8.9* 8.9* 8.3* 9.4*   HEMATOCRIT % 25.3* 28.5* 28.0* 27.2* 30.2*   PLATELETS 10*3/mm3  --   --  240 145 236     Results from last 7 days   Lab Units 08/03/19  0545   SODIUM mmol/L 134*   POTASSIUM mmol/L 5.0   CHLORIDE mmol/L 100   CO2 mmol/L 19.0*   BUN mg/dL 44*   CREATININE mg/dL 1.81*   GLUCOSE mg/dL 282*   CALCIUM mg/dL 8.7     Results from last 7 days   Lab Units 08/01/19  0924   ALK PHOS U/L 92   BILIRUBIN mg/dL 0.2   ALT (SGPT) U/L 14   AST (SGOT) U/L 19               Estimated Creatinine Clearance: 29.4 mL/min (A) (by C-G formula based on SCr of 1.81 mg/dL (H)).      Microbiology:      Radiology:  Imaging Results (last 72 hours)     Procedure Component Value Units Date/Time    CT Abdomen Pelvis Without Contrast [[id number]] Collected:  07/27/19 1403     Updated:  07/27/19 1601    Narrative:       EXAMINATION: CT ABDOMEN/PELVIS WO CONTRAST - 07/27/2019      INDICATION: Abd pain, fever.     TECHNIQUE: Unenhanced CT imaging of abdomen and pelvis was performed  with oral contrast.     The radiation dose reduction device was turned on for each scan per the  ALARA (As Low as Reasonably Achievable) protocol.     COMPARISON: NONE     FINDINGS:      The visualized lower lungs demonstrate a small right-sided partially  loculated pleural effusion. Trace left pleural effusion is identified.  Probable round atelectasis noted within the left lower lobe. Hazy  airspace changes are also noted involving the bilateral lobes which  could relate to pneumonia. Incompletely imaged valve device likely  within the aorta. The heart is enlarged. Additional mitral valve changes  identified.     The liver does not demonstrate acute abnormality. Mild intrahepatic  biliary ductal dilation identified. Hypodensity noted within the  gallbladder. No CT evidence of acute cholecystitis. The spleen is not  enlarged. The pancreas does not demonstrate abnormality. No  peripancreatic fluid collection identified.  Lack of intra-abdominal fat  limits evaluation of visceral structures.     Kidneys are symmetric in size. Prominence of the left renal collecting  system is identified. Prominence of the left ureter is identified. No  evidence of obstructive uropathy. The right ureteral system is not  enlarged. Urinary bladder is partially distended and demonstrates mild  wall thickening.     Distal circumferential esophageal wall thickening identified. The  stomach is decompressed and otherwise unremarkable. Contrast-filled  loops of small bowel are identified, none of which are particularly  enlarged. The largest measures up to 2.2 cm. No evidence of bowel  obstruction. The appendix is not definitively identified secondary to  paucity of intra-abdominal fat. Mild colonic wall thickening identified  particularly involving the distal sigmoid colon. Additional rectal ball  is identified within the distal rectum measuring up to 6.1 cm with mild  rectal wall thickening and perirectal inflammation suggesting proctitis.     The osseous structures appear intact. Degenerative changes the lumbar  spine are identified maximally at L4-L5 with there is grade 1 anterior  listhesis of L4 on L5. No pars and articularis defects identified.  Multilevel vacuum disc phenomena identified. Nonspecific prominent  inguinal lymph nodes with diffuse edema involving the soft tissues.       Impression:          Nonspecific multifocal colonic wall thickening predominantly involving  the distal sigmoid colon and rectum with a 6.1 cm rectal stool ball with  surrounding perirectal inflammation. This is concerning for distal  colitis/stercoral colitis.     Mild prominence of the left ureteral system without evidence for  obstructive uropathy. Mild perinephric inflammation identified.  Correlate for the possibility of underlying ureteritis/pyelonephritis.     Nonvisualization of the appendix.     Bilateral lower lobe trace pleural effusions with lower lobe  airspace  disease.     Additional nonspecific distal esophageal wall thickening.     DICTATED:   07/27/2019  EDITED/ls :   07/27/2019        XR Chest 1 View [[id number]] Collected:  07/26/19 2318     Updated:  07/26/19 2320    Narrative:       Chest 1 view 7/26/2019    INDICATION: Shortness of breath today, aspiration. Type 2 diabetes with ketoacidosis. Benign essential hypertension.    FINDINGS: The heart is enlarged but stable following median sternotomy compared with the previous chest radiograph performed earlier today at 9:08 PM. There are bilateral pleural effusions layering posteriorly with bibasilar atelectasis or infiltrates.  No pneumothorax. Surgical chain sutures left upper lobe.      Impression:       No interval change compared with the previous chest radiograph performed earlier today at 9:08 PM.    Signer Name: Brent Moraes MD   Signed: 7/26/2019 11:18 PM   Workstation Name: [id number]       XR Chest 1 View [[id number]] Collected:  07/26/19 2123     Updated:  07/26/19 2125    Narrative:       CR Chest 1 Vw    INDICATION:   Diabetic ketoacidosis, hypertension, chronic kidney disease. Evaluate for infectious process     COMPARISON:    Chest film 5/11/2015    FINDINGS:  Portable AP view of the chest.  There is median sternotomy change with mild cardiac megaly unchanged. There is pulmonary venous distention with mild perihilar and basilar interstitial change similar to prior study. There is pleural prominence laterally  in the right greater than left hemithorax likely representing increased fat rather than fluid. There is trace blunting of both costophrenic sulci similar to prior study which could represent chronic pleural fluid or pleural thickening. There is a suture  line in the left upper lung unchanged graft      Impression:       No definite change from prior study. There is mild blunting of both costophrenic sulci which could indicate chronic pleural thickening or pleural fluid. There is mild  perihilar interstitial prominence which appears chronic. There is postoperative change  with a vertically oriented suture line in the left upper lung    Signer Name: Chani Avina MD   Signed: 7/26/2019 9:23 PM   Workstation Name: [id number]               Impression:     --Acute sepsis.  Gram-negative srinivas septicemia with concern for urinary  source.  Resuscitative measures ongoing per internal medicine.   CT scan abdomen noted.     --Acute E Coli septicemia.  Urinary source is a primary concern with urinary symptoms and abnormal urinalysis.        --Acute dysuria/frequency with acute pyuria/hematuria on urinalysis and E Coli urinary tract infection at admit.      Some retention symptoms as of August 3.  Internal medicine guiding bladder scan/ultrasound and monitoring for need of in/out catheterization or other urologic work-up    --Acute abdominal pain.  As above.  May relate to intra-abdominal process versus DK.  CT scan with vague inflammatory change per radiology;  You should consider further GI workup at some point, ?inpatient -v- outpatient.  No diarrhea at present.     --Acute DKA.  Missed insulin doses per notes.  Per internal medicine otherwise.     --Chronic anticoagulation associated with prosthetic heart valve.  You need to monitor Coumadin closely while on antimicrobials     --Acute kidney injury with description of prior chronic kidney disease stage II.  Likely prerenal associated with ATN and dehydration/sepsis etc.           PLAN:     --IV zosyn for now;  At time of discharge I anticipate transition to IV Invanz to help facilitate once daily dosing in office to complete course (likely IV  abx to be continued to 8/8)     --Check/review labs cultures and scans     --History per nursing staff and family as well     --Discussed with microbiology     --CT scan noted     --Discussed with Dr. Swan     --Highly complex set of issues with high risk for further serious morbidity and other serious  sequela    --likely daily IV Invanz in office daily after discharge as above           Luis Miller MD  8/3/2019

## 2019-08-03 NOTE — PROGRESS NOTES
"    [facility] Medicine Services  PROGRESS NOTE    Patient Name: Kasia Stewart  : 1955  MRN: [id number]    Date of Admission: 2019  Length of Stay: 8  Primary Care Physician: Melissa Cabrales MD    Subjective   Subjective   CC:  N/V, severe weakness    HPI:  Patient states she feels much better.  Patient states that she urinated one time yesterday and \"dribbled\" overnight.  Patient states that she has some pressure and thinks she needs to void again.  Patient states she eating fine and is ready to go home, but agreed to stay overnight to recheck her labs in the morning.  Positive BM.  No family in the room.    Review of Systems  Gen- No fevers, chills  CV- No chest pain, palpitations  Resp- No cough, dyspnea  GI- No N/V/D, abd pain  -+urinary pressure; \"dribbles when I pee\"  Otherwise ROS is negative except as mentioned in the HPI.    Objective   Objective   Vital Signs:   Temp:  [97.7 °F (36.5 °C)-98.7 °F (37.1 °C)] 98.7 °F (37.1 °C)  Heart Rate:  [66-86] 81  Resp:  [16-18] 16  BP: (125-166)/(45-72) 166/72  Physical Exam:  Constitutional: Alert, WD, obese  female in NAD  Eyes: EOMI, sclerae anicteric, no conjunctival injection  Head: NCAT  ENT: [place], moist mucous membranes   Neck: Supple, non-tender, no thyromegaly, no lymphadenopathy, trachea midline  Respiratory: Non-labored, symmetrical chest expansion, CTAB  Cardiovascular: RRR, no M/R/G, +DP pulses bilaterally  Gastrointestinal: Soft, NT, ND +BS  Musculoskeletal: NICOLE; trace LE pitting edema bilaterally;   +lymphedema (L>R); 6 cm hematoma on left forearm  Neurologic: Oriented x4, strength symmetric in all extremities, follows all commands, speech clear  Skin: No rashes on exposed skin  Psychiatric: Pleasant and cooperative; normal affect    Results Reviewed:  I have personally reviewed current lab, radiology, and data and agree.  Results from last 7 days   Lab Units 19  0545 19  0519 19  0924  " 07/30/19 0359 07/29/19 2036   WBC 10*3/mm3  --   --  4.98  --  3.92 4.95   HEMOGLOBIN g/dL 7.8* 8.9* 8.9*  --  8.3* 9.4*   HEMATOCRIT % 25.3* 28.5* 28.0*  --  27.2* 30.2*   PLATELETS 10*3/mm3  --   --  240  --  145 236   INR  2.02* 2.06* 1.80*   < > 1.31*  --     < > = values in this interval not displayed.     Results from last 7 days   Lab Units 08/03/19  0545 08/01/19  0924 07/30/19 0359 07/29/19 2036   SODIUM mmol/L 134* 138 134* 137   POTASSIUM mmol/L 5.0 4.5 4.7 4.7   CHLORIDE mmol/L 100 103 105 103   CO2 mmol/L 19.0* 21.0* 21.0* 23.0   BUN mg/dL 44* 39* 43* 40*   CREATININE mg/dL 1.81* 1.52* 1.34* 1.46*   GLUCOSE mg/dL 282* 135* 199* 158*   CALCIUM mg/dL 8.7 8.7 8.3* 8.5*   ALT (SGPT) U/L  --  14 16 19   AST (SGOT) U/L  --  19 24 26   TROPONIN T ng/mL  --   --   --  0.017     Estimated Creatinine Clearance: 29.4 mL/min (A) (by C-G formula based on SCr of 1.81 mg/dL (H)).    Microbiology Results Abnormal     Procedure Component Value - Date/Time    Blood Culture - Blood, Arm, Left [[id number]]  (Abnormal) Collected:  07/26/19 1550    Lab Status:  Final result Specimen:  Blood from Arm, Left Updated:  07/29/19 0602     Blood Culture Escherichia coli     Comment: Refer to previous blood culture collected on 7/26/2019 1540 for MICs          Gram Stain Aerobic Bottle Gram negative bacilli      Anaerobic Bottle Gram negative bacilli    Blood Culture - Blood, Hand, Right [[id number]]  (Abnormal)  (Susceptibility) Collected:  07/26/19 1540    Lab Status:  Final result Specimen:  Blood from Hand, Right Updated:  07/29/19 0559     Blood Culture Escherichia coli     Gram Stain Aerobic Bottle Gram negative bacilli      Anaerobic Bottle Gram negative bacilli    Susceptibility      Escherichia coli     EMANUEL     Ampicillin Susceptible     Ampicillin + Sulbactam Susceptible     Cefazolin Susceptible     Cefepime Susceptible     Ceftazidime Susceptible     Ceftriaxone Susceptible     Gentamicin Susceptible     Levofloxacin  Susceptible     Piperacillin + Tazobactam Susceptible     Trimethoprim + Sulfamethoxazole Susceptible                    Urine Culture - Urine, Urine, Clean Catch [[id number]]  (Abnormal)  (Susceptibility) Collected:  07/26/19 2125    Lab Status:  Final result Specimen:  Urine, Clean Catch Updated:  07/28/19 1144     Urine Culture >100,000 CFU/mL Escherichia coli    Susceptibility      Escherichia coli     EMANUEL     Ampicillin Susceptible     Ampicillin + Sulbactam Susceptible     Cefazolin Susceptible     Cefepime Susceptible     Ceftazidime Susceptible     Ceftriaxone Susceptible     Gentamicin Susceptible     Levofloxacin Susceptible     Nitrofurantoin Susceptible     Piperacillin + Tazobactam Susceptible     Tetracycline Susceptible     Trimethoprim + Sulfamethoxazole Susceptible                    Blood Culture ID, PCR - Blood, Hand, Right [[id number]]  (Abnormal) Collected:  07/26/19 1540    Lab Status:  Final result Specimen:  Blood from Hand, Right Updated:  07/27/19 0843     BCID, PCR Escherichia coli. Identification by BCID PCR.        Imaging Results (last 24 hours)     ** No results found for the last 24 hours. **        I have reviewed the medications:  Scheduled Meds:    allopurinol 100 mg Oral Daily   aspirin 81 mg Oral Daily   atorvastatin 20 mg Oral Daily   budesonide-formoterol 2 puff Inhalation BID - RT   carvedilol 6.25 mg Oral BID With Meals   cetirizine 5 mg Oral Daily   ferrous sulfate 325 mg Oral Daily With Breakfast   insulin detemir 10 Units Subcutaneous QAM   insulin detemir 6 Units Subcutaneous Nightly   insulin lispro 0-9 Units Subcutaneous 4x Daily With Meals & Nightly   insulin lispro 7 Units Subcutaneous TID With Meals   isosorbide mononitrate 30 mg Oral Daily   levothyroxine 150 mcg Oral Q AM   pantoprazole 40 mg Oral Daily   piperacillin-tazobactam 3.375 g Intravenous Q8H   saccharomyces boulardii 250 mg Oral Daily   sodium chloride 3 mL Intravenous Q12H   warfarin 3.75 mg Oral Once per  day on Sun Mon Wed Fri   warfarin 7.5 mg Oral Once per day on Tue Thu Sat     Continuous Infusions:    dextrose 5 % and sodium chloride 0.45 % 250 mL/hr    dextrose 5 % and sodium chloride 0.45 % with KCl 20 mEq/L 250 mL/hr    dextrose 5 % and sodium chloride 0.45 % with KCl 40 mEq/L 250 mL/hr    Pharmacy to dose warfarin     custom IV KCl infusion builder 250 mL/hr    sodium chloride 250 mL/hr    sodium chloride 0.45 % with KCl 20 mEq 250 mL/hr    sodium chloride 30 mL/hr    sodium chloride 250 mL/hr    sodium chloride 10 mL/hr    sodium chloride 75 mL/hr Last Rate: 75 mL/hr (08/03/19 0643)   sodium chloride 0.9 % with KCl 20 mEq 250 mL/hr Last Rate: Stopped (07/27/19 0326)   sodium chloride 0.9 % with KCl 40 mEq/L 250 mL/hr      PRN Meds:.•  acetaminophen  •  acetaminophen  •  bisacodyl  •  calcium carbonate  •  dextrose  •  dextrose  •  dextrose  •  dextrose 5 % and sodium chloride 0.45 %  •  dextrose 5 % and sodium chloride 0.45 % with KCl 20 mEq/L  •  dextrose 5 % and sodium chloride 0.45 % with KCl 40 mEq/L  •  fluticasone  •  glucagon (human recombinant)  •  loperamide  •  ondansetron  •  Pharmacy to dose warfarin  •  custom IV KCl infusion builder  •  sodium chloride  •  sodium chloride 0.45 % with KCl 20 mEq  •  sodium chloride  •  sodium chloride  •  sodium chloride  •  sodium chloride  •  sodium chloride  •  sodium chloride  •  sodium chloride  •  sodium chloride  •  sodium chloride 0.9 % with KCl 20 mEq  •  sodium chloride 0.9 % with KCl 40 mEq/L    Assessment/Plan   Assessment / Plan     Active Hospital Problems    Diagnosis  POA   • **Diabetic ketoacidosis without coma associated with type 2 diabetes mellitus (CMS/HCC) [E11.10]  Yes   • Anemia, chronic disease [D63.8]  Yes   • Acute-on-chronic kidney injury (CMS/HCC) [N17.9, N18.9]  Yes   • History of heart valve replacement [Z95.2]  Not Applicable   • Chronic anticoagulation [Z79.01]  Not Applicable   • Hypertension [I10]  Yes   • CKD (chronic kidney  disease), stage II [N18.2]  Yes   • UTI (urinary tract infection), bacterial [N39.0, A49.9]  Yes      Resolved Hospital Problems   No resolved problems to display.     Brief Hospital Course to date:  Kasia Stewart is a 64 y.o. female with past medical history significant for diabetes mellitus, chronic kidney disease, hypertension, multiple hospitalization for nausea vomiting secondary to diabetic ketoacidosis.  Patient was admitted for nausea vomiting and severe weakness secondary to hyperglycemia and diabetic ketoacidosis.  Patient also was febrile and blood cultures showed gram-negative srinivas bacteremia.    Assessment:  *Hyperglycemia and diabetic ketoacidosis.  Ketoacidosis has resolved after insulin drip and  fluid resuscitation  --24H glucose 231-405  --HS Levemir added @6units; continue QAM Levemir dose (take BID long-acting insulin at home)  --Continue QAC humalog and SSI  --A1c 8.4    Anemia  --hgb 7.8; decreased from 8.9 yesterday   Ref. Range 7/28/2019 04:26   Iron Latest Ref Range: 37 - 145 mcg/dL 20 (L)   Ferritin Latest Ref Range: 13.00 - 150.00 ng/mL 453.70 (H)   Iron Saturation Latest Ref Range: 20 - 50 % 8 (L)   Transferrin Latest Ref Range: 200 - 360 mg/dL 159 (L)   TIBC Latest Ref Range: 298 - 536 mcg/dL 237 (L)   Folate Latest Ref Range: 4.78 - 24.20 ng/mL 15.30   --AM labs    *E. coli UTI and bacteremia.   --ID following   --IV Zosyn and remains afebrile.  --repeat UA if WBC goes up or fever  --AM labs    A/CKI  --Cr 1.81; elevated from1.52 yesterday  --bladder scan pre/post void if large amount of urine  --renal US pending  --Followed by Dr Tavera @ [facility]  --Encouraged increased oral fluids  --AM labs    *Sepsis with leukocytosis and fever secondary to above --resolved.    *Dehydration secondary to hyperglycemia resolved with IV fluid replacement.  --Continue IV fluids  --Cr/BUN elevated    *Severe and chronic edema of lower extremities, left more than right. Bilateral duplex was negative  for DVT.  --No change; trace pitting    *Valvular heart disease status post aortic valve replacement and patient is on chronic Coumadin therapy  --INR 2.02  --monitored by pharmacy    * left arm hematoma, monitor.  --No change in size; definitely not large    *  protein malnutrition with low albumin and low total protein.    *Nausea, vomiting, abdominal pain secondary to ketoacidosis.  Family tells me that she has had EGD done recently at [facility] however we have not been able to obtain the results.    PLAN:  -  -Labs in a.m.  -Monitor the left arm hematoma  -Home when she is a little more stable    DVT Prophylaxis: Anticoagulated    Disposition: I expect the patient to be discharged home in a few days.    CODE STATUS:   Code Status and Medical Interventions:   Ordered at: 07/26/19 5100     Code Status:    CPR     Medical Interventions (Level of Support Prior to Arrest):    Full     Electronically signed by FELA Hernandez, 08/03/19, 10:00 AM.

## 2019-08-03 NOTE — PLAN OF CARE
Problem: Patient Care Overview  Goal: Plan of Care Review  Outcome: Ongoing (interventions implemented as appropriate)   08/03/19 1524   Coping/Psychosocial   Plan of Care Reviewed With patient   Plan of Care Review   Progress no change   OTHER   Outcome Summary Patient has no new complaints. Continues to ask for diarrhea medication BUT both bowel movements witnessed were normal in appearance. Voiding. Hematoma site to left forearm marked, no growth noticed. Vitals stable.       Problem: Hyperglycemia, Persistent (Adult)  Goal: Signs and Symptoms of Listed Potential Problems Will be Absent, Minimized or Managed (Hyperglycemia, Persistent)  Outcome: Ongoing (interventions implemented as appropriate)      Problem: Skin Injury Risk (Adult)  Goal: Identify Related Risk Factors and Signs and Symptoms  Outcome: Ongoing (interventions implemented as appropriate)

## 2019-08-04 VITALS
HEART RATE: 71 BPM | WEIGHT: 165 LBS | HEIGHT: 62 IN | DIASTOLIC BLOOD PRESSURE: 53 MMHG | SYSTOLIC BLOOD PRESSURE: 125 MMHG | RESPIRATION RATE: 16 BRPM | OXYGEN SATURATION: 99 % | TEMPERATURE: 97.9 F | BODY MASS INDEX: 30.36 KG/M2

## 2019-08-04 PROBLEM — N17.9 ACUTE-ON-CHRONIC KIDNEY INJURY (HCC): Status: RESOLVED | Noted: 2019-08-03 | Resolved: 2019-08-04

## 2019-08-04 PROBLEM — A41.50 SEPSIS DUE TO GRAM NEGATIVE BACTERIA (HCC): Status: ACTIVE | Noted: 2019-08-04

## 2019-08-04 PROBLEM — B96.20 E COLI BACTEREMIA: Status: RESOLVED | Noted: 2019-08-04 | Resolved: 2019-08-04

## 2019-08-04 PROBLEM — A41.50 SEPSIS DUE TO GRAM NEGATIVE BACTERIA (HCC): Status: RESOLVED | Noted: 2019-08-04 | Resolved: 2019-08-04

## 2019-08-04 PROBLEM — N18.30 CKD (CHRONIC KIDNEY DISEASE), STAGE III (HCC): Status: ACTIVE | Noted: 2019-07-26

## 2019-08-04 PROBLEM — R78.81 E COLI BACTEREMIA: Status: ACTIVE | Noted: 2019-08-04

## 2019-08-04 PROBLEM — N18.9 ACUTE-ON-CHRONIC KIDNEY INJURY (HCC): Status: RESOLVED | Noted: 2019-08-03 | Resolved: 2019-08-04

## 2019-08-04 PROBLEM — A49.9 UTI (URINARY TRACT INFECTION), BACTERIAL: Status: RESOLVED | Noted: 2019-07-26 | Resolved: 2019-08-04

## 2019-08-04 PROBLEM — R78.81 E COLI BACTEREMIA: Status: RESOLVED | Noted: 2019-08-04 | Resolved: 2019-08-04

## 2019-08-04 PROBLEM — N39.0 UTI (URINARY TRACT INFECTION), BACTERIAL: Status: RESOLVED | Noted: 2019-07-26 | Resolved: 2019-08-04

## 2019-08-04 PROBLEM — B96.20 E COLI BACTEREMIA: Status: ACTIVE | Noted: 2019-08-04

## 2019-08-04 LAB
ANION GAP SERPL CALCULATED.3IONS-SCNC: 15 MMOL/L (ref 5–15)
BUN BLD-MCNC: 46 MG/DL (ref 8–23)
BUN/CREAT SERPL: 29.1 (ref 7–25)
CALCIUM SPEC-SCNC: 9 MG/DL (ref 8.6–10.5)
CHLORIDE SERPL-SCNC: 98 MMOL/L (ref 98–107)
CO2 SERPL-SCNC: 18 MMOL/L (ref 22–29)
CREAT BLD-MCNC: 1.58 MG/DL (ref 0.57–1)
DEPRECATED RDW RBC AUTO: 53.6 FL (ref 37–54)
ERYTHROCYTE [DISTWIDTH] IN BLOOD BY AUTOMATED COUNT: 14.7 % (ref 12.3–15.4)
GFR SERPL CREATININE-BSD FRML MDRD: 40 ML/MIN/1.73
GLUCOSE BLD-MCNC: 193 MG/DL (ref 65–99)
GLUCOSE BLDC GLUCOMTR-MCNC: 108 MG/DL (ref 70–130)
GLUCOSE BLDC GLUCOMTR-MCNC: 265 MG/DL (ref 70–130)
HCT VFR BLD AUTO: 27.6 % (ref 34–46.6)
HGB BLD-MCNC: 8.8 G/DL (ref 12–15.9)
INR PPP: 1.89 (ref 0.85–1.16)
MCH RBC QN AUTO: 31.3 PG (ref 26.6–33)
MCHC RBC AUTO-ENTMCNC: 31.9 G/DL (ref 31.5–35.7)
MCV RBC AUTO: 98.2 FL (ref 79–97)
PLATELET # BLD AUTO: 254 10*3/MM3 (ref 140–450)
PMV BLD AUTO: 10.9 FL (ref 6–12)
POTASSIUM BLD-SCNC: 4.6 MMOL/L (ref 3.5–5.2)
PROTHROMBIN TIME: 20.9 SECONDS (ref 11.2–14.3)
RBC # BLD AUTO: 2.81 10*6/MM3 (ref 3.77–5.28)
SODIUM BLD-SCNC: 131 MMOL/L (ref 136–145)
WBC NRBC COR # BLD: 5.28 10*3/MM3 (ref 3.4–10.8)

## 2019-08-04 PROCEDURE — 99239 HOSP IP/OBS DSCHRG MGMT >30: CPT | Performed by: HOSPITALIST

## 2019-08-04 PROCEDURE — 25010000002 ERTAPENEM PER 500 MG: Performed by: INTERNAL MEDICINE

## 2019-08-04 PROCEDURE — 63710000001 INSULIN LISPRO (HUMAN) PER 5 UNITS: Performed by: INTERNAL MEDICINE

## 2019-08-04 PROCEDURE — 80048 BASIC METABOLIC PNL TOTAL CA: CPT | Performed by: NURSE PRACTITIONER

## 2019-08-04 PROCEDURE — 82962 GLUCOSE BLOOD TEST: CPT

## 2019-08-04 PROCEDURE — 25010000002 ONDANSETRON PER 1 MG: Performed by: FAMILY MEDICINE

## 2019-08-04 PROCEDURE — 63710000001 INSULIN DETEMIR PER 5 UNITS: Performed by: INTERNAL MEDICINE

## 2019-08-04 PROCEDURE — 94799 UNLISTED PULMONARY SVC/PX: CPT

## 2019-08-04 PROCEDURE — 85027 COMPLETE CBC AUTOMATED: CPT | Performed by: NURSE PRACTITIONER

## 2019-08-04 PROCEDURE — 85610 PROTHROMBIN TIME: CPT | Performed by: FAMILY MEDICINE

## 2019-08-04 PROCEDURE — 25010000002 PIPERACILLIN SOD-TAZOBACTAM PER 1 G: Performed by: INTERNAL MEDICINE

## 2019-08-04 RX ORDER — SACCHAROMYCES BOULARDII 250 MG
250 CAPSULE ORAL 2 TIMES DAILY
Qty: 20 CAPSULE | Refills: 0 | Status: SHIPPED | OUTPATIENT
Start: 2019-08-04 | End: 2019-08-14

## 2019-08-04 RX ORDER — INSULIN GLARGINE 100 [IU]/ML
12 INJECTION, SOLUTION SUBCUTANEOUS 2 TIMES DAILY
Start: 2019-08-04

## 2019-08-04 RX ORDER — FUROSEMIDE 20 MG/1
20 TABLET ORAL DAILY
Start: 2019-08-04

## 2019-08-04 RX ADMIN — ASPIRIN 81 MG: 81 TABLET, COATED ORAL at 09:34

## 2019-08-04 RX ADMIN — TAZOBACTAM SODIUM AND PIPERACILLIN SODIUM 3.38 G: 375; 3 INJECTION, SOLUTION INTRAVENOUS at 06:18

## 2019-08-04 RX ADMIN — PANTOPRAZOLE SODIUM 40 MG: 40 TABLET, DELAYED RELEASE ORAL at 09:34

## 2019-08-04 RX ADMIN — FERROUS SULFATE TAB 325 MG (65 MG ELEMENTAL FE) 325 MG: 325 (65 FE) TAB at 09:34

## 2019-08-04 RX ADMIN — INSULIN LISPRO 7 UNITS: 100 INJECTION, SOLUTION INTRAVENOUS; SUBCUTANEOUS at 09:37

## 2019-08-04 RX ADMIN — BUDESONIDE AND FORMOTEROL FUMARATE DIHYDRATE 2 PUFF: 80; 4.5 AEROSOL RESPIRATORY (INHALATION) at 09:29

## 2019-08-04 RX ADMIN — CETIRIZINE HYDROCHLORIDE 5 MG: 10 TABLET, FILM COATED ORAL at 09:34

## 2019-08-04 RX ADMIN — ERTAPENEM SODIUM 1 G: 1 INJECTION, POWDER, LYOPHILIZED, FOR SOLUTION INTRAMUSCULAR; INTRAVENOUS at 14:14

## 2019-08-04 RX ADMIN — LOPERAMIDE HYDROCHLORIDE 2 MG: 2 CAPSULE ORAL at 09:40

## 2019-08-04 RX ADMIN — CARVEDILOL 6.25 MG: 12.5 TABLET, FILM COATED ORAL at 09:35

## 2019-08-04 RX ADMIN — INSULIN DETEMIR 10 UNITS: 100 INJECTION, SOLUTION SUBCUTANEOUS at 09:40

## 2019-08-04 RX ADMIN — SODIUM CHLORIDE, PRESERVATIVE FREE 3 ML: 5 INJECTION INTRAVENOUS at 09:35

## 2019-08-04 RX ADMIN — INSULIN LISPRO 6 UNITS: 100 INJECTION, SOLUTION INTRAVENOUS; SUBCUTANEOUS at 12:52

## 2019-08-04 RX ADMIN — INSULIN LISPRO 7 UNITS: 100 INJECTION, SOLUTION INTRAVENOUS; SUBCUTANEOUS at 12:52

## 2019-08-04 RX ADMIN — ALLOPURINOL 100 MG: 100 TABLET ORAL at 09:34

## 2019-08-04 RX ADMIN — Medication 250 MG: at 09:34

## 2019-08-04 RX ADMIN — LEVOTHYROXINE SODIUM 150 MCG: 150 TABLET ORAL at 06:18

## 2019-08-04 RX ADMIN — ONDANSETRON 4 MG: 2 INJECTION INTRAMUSCULAR; INTRAVENOUS at 00:21

## 2019-08-04 RX ADMIN — SODIUM CHLORIDE 30 ML/HR: 9 INJECTION, SOLUTION INTRAVENOUS at 11:56

## 2019-08-04 RX ADMIN — ISOSORBIDE MONONITRATE 30 MG: 30 TABLET, EXTENDED RELEASE ORAL at 09:34

## 2019-08-04 RX ADMIN — ATORVASTATIN CALCIUM 20 MG: 20 TABLET, FILM COATED ORAL at 09:34

## 2019-08-04 NOTE — PROGRESS NOTES
[facility] Progress Note        Antibiotics:  Anti-Infectives (From admission, onward)    Ordered     Dose/Rate Route Frequency Start Stop    07/29/19 0733  piperacillin-tazobactam (ZOSYN) 3.375 g in iso-osmotic dextrose 50 ml (premix)     Ordering Provider:  Luis Miller MD    3.375 g  over 4 Hours Intravenous Every 8 Hours 07/29/19 1430 08/08/19 1429    07/29/19 0733  piperacillin-tazobactam (ZOSYN) 3.375 g in iso-osmotic dextrose 50 ml (premix)     Ordering Provider:  Luis Miller MD    3.375 g  over 30 Minutes Intravenous Once 07/29/19 0830 07/29/19 0920    07/27/19 0853  meropenem (MERREM) 1 g/100 mL 0.9% NS VTB (mbp)     Ordering Provider:  Luis Miller MD    1 g  over 30 Minutes Intravenous Once 07/27/19 0945 07/27/19 1037          CC: abd pain    HPI:    Patient is a 64 y.o.  Yr old female with history of diabetes mellitus, history of valve replacement on chronic anticoagulation with Coumadin, chronic kidney disease described as stage II by internal medicine.  She was admitted to [facility] July 26 with acute onset nausea/vomiting, generalized fatigue, dysuria and abdominal pain.  Subsequent fever exceeding 103 and blood culture positivity for GNR.     8/4/19 denies new urinary complaints.; generally sleepy;  generally fatigued and feels debilitated;  No abd pain.  She  had nausea/vomiting, now better and  denies diarrhea.  No hematochezia melena or hematemesis.  No jaundice.     No dysuria ,   No hematuria or pyuria and currently denies flank pain.       No headache photophobia or neck stiffness.  Denies cough or hemoptysis.  No skin rash.         ROS:      8/4/19 No f/c/s. No n/v/d. No rash. No new ADR to Abx.       Constitutional-- generally fatigued with malaise and poor appetite  Heent-- No new vision, hearing or throat complaints.  No epistaxis or oral sores.  Denies odynophagia or dysphagia.  No flashers, floaters or eye pain. No odynophagia or dysphagia. No headache,  "photophobia or neck stiffness.  CV-- No chest pain, palpitation or syncope  Resp-- No SOB/cough/Hemoptysis  GI- -- No dysuria, hematuria, or flank pain.  Denies  flank pain.  Lymph- no swollen lymph nodes in neck/axilla or groin.   Heme- No active bruising or bleeding; no Hx of DVT or PE.  MS-- no swelling or pain in the bones or joints of arms/legs.  No new back pain.  Neuro-- No acute focal weakness or numbness in the arms or legs.  No seizures.     Full 12 point review of systems reviewed and negative otherwise for acute complaints, except for above          PE:   /53 (BP Location: Left arm, Patient Position: Sitting)   Pulse 71   Temp 97.9 °F (36.6 °C) (Oral)   Resp 16   Ht 157.5 cm (62\")   Wt 74.8 kg (165 lb)   SpO2 99%   BMI 30.18 kg/m²       GENERAL:sleepy, in no acute distress.  Appears chronically ill, nasal cannula oxygen  HEENT: Normocephalic, atraumatic.  PERRL. EOMI. No conjunctival injection. No icterus. Oropharynx clear without evidence of thrush or exudate. No evidence of peridontal disease.    NECK: Supple without nuchal rigidity. No mass.  LYMPH: No cervical, axillary or inguinal lymphadenopathy.  HEART: RRR; No murmur, rubs, gallops.   LUNGS: Diminished at bases bilaterally without wheezing, rales, rhonchi. Normal respiratory effort. Nonlabored. No dullness.  ABDOMEN: Soft,less tender, nondistended. Positive bowel sounds. No rebound or guarding. NO mass or HSM.  EXT:  No cyanosis, clubbing or edema. No cord.  : Genitalia generally unremarkable.  Without Horton catheter.  MSK: FROM without joint effusions noted arms/legs.    SKIN: Warm and dry without cutaneous eruptions on Inspection/palpation.    NEURO: sleepy     No peripheral stigmata/phenomena of endocarditis     No CVA tenderness    Laboratory Data    Results from last 7 days   Lab Units 08/04/19  1323 08/03/19  0545 08/02/19  0519 08/01/19  0924 07/30/19  0359   WBC 10*3/mm3 5.28  --   --  4.98 3.92   HEMOGLOBIN g/dL 8.8* " 7.8* 8.9* 8.9* 8.3*   HEMATOCRIT % 27.6* 25.3* 28.5* 28.0* 27.2*   PLATELETS 10*3/mm3 254  --   --  240 145     Results from last 7 days   Lab Units 08/04/19  1037   SODIUM mmol/L 131*   POTASSIUM mmol/L 4.6   CHLORIDE mmol/L 98   CO2 mmol/L 18.0*   BUN mg/dL 46*   CREATININE mg/dL 1.58*   GLUCOSE mg/dL 193*   CALCIUM mg/dL 9.0     Results from last 7 days   Lab Units 08/01/19  0924   ALK PHOS U/L 92   BILIRUBIN mg/dL 0.2   ALT (SGPT) U/L 14   AST (SGOT) U/L 19               Estimated Creatinine Clearance: 33.6 mL/min (A) (by C-G formula based on SCr of 1.58 mg/dL (H)).      Microbiology:      Radiology:  Imaging Results (last 72 hours)     Procedure Component Value Units Date/Time    CT Abdomen Pelvis Without Contrast [[id number]] Collected:  07/27/19 1403     Updated:  07/27/19 1601    Narrative:       EXAMINATION: CT ABDOMEN/PELVIS WO CONTRAST - 07/27/2019      INDICATION: Abd pain, fever.     TECHNIQUE: Unenhanced CT imaging of abdomen and pelvis was performed  with oral contrast.     The radiation dose reduction device was turned on for each scan per the  ALARA (As Low as Reasonably Achievable) protocol.     COMPARISON: NONE     FINDINGS:      The visualized lower lungs demonstrate a small right-sided partially  loculated pleural effusion. Trace left pleural effusion is identified.  Probable round atelectasis noted within the left lower lobe. Hazy  airspace changes are also noted involving the bilateral lobes which  could relate to pneumonia. Incompletely imaged valve device likely  within the aorta. The heart is enlarged. Additional mitral valve changes  identified.     The liver does not demonstrate acute abnormality. Mild intrahepatic  biliary ductal dilation identified. Hypodensity noted within the  gallbladder. No CT evidence of acute cholecystitis. The spleen is not  enlarged. The pancreas does not demonstrate abnormality. No  peripancreatic fluid collection identified. Lack of intra-abdominal fat  limits  evaluation of visceral structures.     Kidneys are symmetric in size. Prominence of the left renal collecting  system is identified. Prominence of the left ureter is identified. No  evidence of obstructive uropathy. The right ureteral system is not  enlarged. Urinary bladder is partially distended and demonstrates mild  wall thickening.     Distal circumferential esophageal wall thickening identified. The  stomach is decompressed and otherwise unremarkable. Contrast-filled  loops of small bowel are identified, none of which are particularly  enlarged. The largest measures up to 2.2 cm. No evidence of bowel  obstruction. The appendix is not definitively identified secondary to  paucity of intra-abdominal fat. Mild colonic wall thickening identified  particularly involving the distal sigmoid colon. Additional rectal ball  is identified within the distal rectum measuring up to 6.1 cm with mild  rectal wall thickening and perirectal inflammation suggesting proctitis.     The osseous structures appear intact. Degenerative changes the lumbar  spine are identified maximally at L4-L5 with there is grade 1 anterior  listhesis of L4 on L5. No pars and articularis defects identified.  Multilevel vacuum disc phenomena identified. Nonspecific prominent  inguinal lymph nodes with diffuse edema involving the soft tissues.       Impression:          Nonspecific multifocal colonic wall thickening predominantly involving  the distal sigmoid colon and rectum with a 6.1 cm rectal stool ball with  surrounding perirectal inflammation. This is concerning for distal  colitis/stercoral colitis.     Mild prominence of the left ureteral system without evidence for  obstructive uropathy. Mild perinephric inflammation identified.  Correlate for the possibility of underlying ureteritis/pyelonephritis.     Nonvisualization of the appendix.     Bilateral lower lobe trace pleural effusions with lower lobe airspace  disease.     Additional  nonspecific distal esophageal wall thickening.     DICTATED:   07/27/2019  EDITED/ls :   07/27/2019        XR Chest 1 View [[id number]] Collected:  07/26/19 2318     Updated:  07/26/19 2320    Narrative:       Chest 1 view 7/26/2019    INDICATION: Shortness of breath today, aspiration. Type 2 diabetes with ketoacidosis. Benign essential hypertension.    FINDINGS: The heart is enlarged but stable following median sternotomy compared with the previous chest radiograph performed earlier today at 9:08 PM. There are bilateral pleural effusions layering posteriorly with bibasilar atelectasis or infiltrates.  No pneumothorax. Surgical chain sutures left upper lobe.      Impression:       No interval change compared with the previous chest radiograph performed earlier today at 9:08 PM.    Signer Name: Brent Moraes MD   Signed: 7/26/2019 11:18 PM   Workstation Name: [id number]       XR Chest 1 View [[id number]] Collected:  07/26/19 2123     Updated:  07/26/19 2125    Narrative:       CR Chest 1 Vw    INDICATION:   Diabetic ketoacidosis, hypertension, chronic kidney disease. Evaluate for infectious process     COMPARISON:    Chest film 5/11/2015    FINDINGS:  Portable AP view of the chest.  There is median sternotomy change with mild cardiac megaly unchanged. There is pulmonary venous distention with mild perihilar and basilar interstitial change similar to prior study. There is pleural prominence laterally  in the right greater than left hemithorax likely representing increased fat rather than fluid. There is trace blunting of both costophrenic sulci similar to prior study which could represent chronic pleural fluid or pleural thickening. There is a suture  line in the left upper lung unchanged graft      Impression:       No definite change from prior study. There is mild blunting of both costophrenic sulci which could indicate chronic pleural thickening or pleural fluid. There is mild perihilar interstitial prominence which  appears chronic. There is postoperative change  with a vertically oriented suture line in the left upper lung    Signer Name: Chani Avina MD   Signed: 7/26/2019 9:23 PM   Workstation Name: [id number]               Impression:     --Acute sepsis.  Gram-negative srinivas septicemia with concern for urinary  source.  Resuscitative measures ongoing per internal medicine.   CT scan abdomen noted.     --Acute E Coli septicemia.  Urinary source is a primary concern with urinary symptoms and abnormal urinalysis.        --Acute dysuria/frequency with acute pyuria/hematuria on urinalysis and E Coli urinary tract infection at admit.      Some retention symptoms as of August 3.  Internal medicine guiding bladder scan/ultrasound and monitoring for need of in/out catheterization or other urologic work-up    --Acute abdominal pain.  As above.  May relate to intra-abdominal process versus DK.  CT scan with vague inflammatory change per radiology;  You should consider further GI workup at some point, ?inpatient -v- outpatient.  No diarrhea at present.     --Acute DKA.  Missed insulin doses per notes.  Per internal medicine otherwise.     --Chronic anticoagulation associated with prosthetic heart valve.  You need to monitor Coumadin closely while on antimicrobials     --Acute kidney injury with description of prior chronic kidney disease stage II.  Likely prerenal associated with ATN and dehydration/sepsis etc.           PLAN:     --IV Invanz, (likely IV  abx to be continued to 8/8)     --Check/review labs cultures and scans     --History per nursing staff and family as well     --Discussed with microbiology     --CT scan noted     --Discussed with Dr. Swan     --Highly complex set of issues with high risk for further serious morbidity and other serious sequela    --likely daily IV Invanz in office daily starting 8/5 at 12:30 and appt to see me at that time also         Luis Miller MD  8/4/2019

## 2019-08-04 NOTE — PROGRESS NOTES
Case Management Discharge Note    Final Note: Plan is home w/ family. Denies any discharge needs.    Destination      No service has been selected for the patient.      Durable Medical Equipment      No service has been selected for the patient.      Dialysis/Infusion      No service has been selected for the patient.      Home Medical Care      No service has been selected for the patient.      Therapy      No service has been selected for the patient.      Community Resources      No service has been selected for the patient.             Final Discharge Disposition Code: 01 - home or self-care

## 2019-08-04 NOTE — DISCHARGE INSTR - APPOINTMENTS
Melissa Cabrales MD  PCP - General Internal Medicine [phone]  [phone] [facility] [id number]     Next Steps: Follow up      Instructions: 1 week      CALL Monday TO SCHEDULE APPOINTMENT      Jeff Tavera MD   Nephrology [phone]  [phone]  [phone] [facility] [address]  [facility] [address]     Next Steps: Follow up        CALL Monday TO SCHEDULE APPOINTMENT    Luis Garcia MD   Infectious Diseases [phone]  [phone]  [phone] [facility] [address]  [facility] [address]     Next Steps: Follow up      FOLLOW-UP WITH DR. GARCIA AS INSTRUCTED

## 2019-08-04 NOTE — PLAN OF CARE
Problem: Patient Care Overview  Goal: Plan of Care Review  Outcome: Ongoing (interventions implemented as appropriate)  Pt still states the need to urinate and can't. Bladder scan performed and she had zero in her bladder. Pt urinated several times through night. vss

## 2019-08-04 NOTE — DISCHARGE SUMMARY
[facility] Medicine Services  DISCHARGE SUMMARY    Patient Name: Kasia Stewart  : 1955  MRN: [id number]    Date of Admission: 2019  Date of Discharge:  19  Primary Care Physician: Melissa Cabrales MD    Consults     Date and Time Order Name Status Description    2019 0806 Inpatient Infectious Diseases Consult Completed           Hospital Course     Presenting Problem:   Diabetic ketoacidosis without coma associated with type 2 diabetes mellitus (CMS/[facility]) [E11.10]    Active Hospital Problems    Diagnosis  POA   • **Diabetic ketoacidosis without coma associated with type 2 diabetes mellitus (CMS/HCC) [E11.10]  Yes   • Anemia, chronic disease [D63.8]  Yes   • History of heart valve replacement [Z95.2]  Not Applicable   • Chronic anticoagulation [Z79.01]  Not Applicable   • Hypertension [I10]  Yes   • CKD (chronic kidney disease), stage III (CMS/[facility]) [N18.3]  Yes      Resolved Hospital Problems    Diagnosis Date Resolved POA   • E coli bacteremia [R78.81] 2019 Yes   • Sepsis due to Gram negative bacteria (CMS/[facility]) [A41.50] 2019 Yes   • Acute-on-chronic kidney injury (CMS/[facility]) [N17.9, N18.9] 2019 Yes   • UTI (urinary tract infection), bacterial [N39.0, A49.9] 2019 Yes          Hospital Course:  Kasia Stewart is a 64 y.o. female with past medical history significant for diabetes mellitus, chronic kidney disease stage 3, hypertension, valvular heart disease s/p aortic valve replacement on chronic Coumadin, multiple hospitalizations for nausea/vomiting secondary to diabetic ketoacidosis.  Patient was admitted for nausea, vomiting, and severe weakness secondary to hyperglycemia and diabetic ketoacidosis. She was treated with an insulin drip and DKA resolved.      Patient also was febrile with a UTI, and blood cultures showed gram-negative srinivas bacteremia which ultimately came out to be E.coli. She was treated with IV Zosyn. ID was  consulted and Dr. Miller recommends daily Invanz in the office after discharge.     Patient had SHAINA on CKD. Her baseline appears to be anywhere from 1.4-1.8. Follows with Dr. Tavera. Renal ultrasound with no acute abnormalities. She was encouraged to increase her oral fluids with improvement. Cr at time of discharge is 1.58. She needs to follow up with Dr. Tavera in 2 weeks with repeat labs.    She has severe and chronic edema of the lower extremities, left greater than right. BLE duplex ruled out DVT.     Discharge Follow Up Recommendations for labs/diagnostics:  F/U with PCP in 1 week  F/U with Dr. Miller in the office tomorrow for daily Invanz  F/U with Dr. Tavera/Nephrology in 2 weeks with repeat BMP    Day of Discharge     HPI:   Patient seen this morning. No complaints. Just wants to go home.     Review of Systems  Gen-no fevers, no chills  CV-no chest pain, no palpitations  Resp-no cough, no dyspnea  GI-no N/V/D, no abd pain    Otherwise ROS is negative except as mentioned in the HPI.    Vital Signs:   Temp:  [97.6 °F (36.4 °C)-98.4 °F (36.9 °C)] 97.9 °F (36.6 °C)  Heart Rate:  [62-88] 71  Resp:  [16-18] 16  BP: (125-148)/(53-79) 125/53     Physical Exam:  Gen-no acute distress  HENT-NCAT, mucous membranes moist  CV-RRR, S1 S2 normal, no m/r/g  Resp-CTAB, no wheezes or rales  Abd-soft, NT, ND, +BS  Ext-trace BLE edema/lymphedema  Neuro-A&Ox3, no focal deficits  Skin-no rashes  Psych-appropriate mood      Pertinent  and/or Most Recent Results     Results from last 7 days   Lab Units 08/04/19  1323 08/04/19  1037 08/03/19  0545 08/02/19  0519 08/01/19  0924 07/30/19  0359 07/29/19 2036   WBC 10*3/mm3 5.28  --   --   --  4.98 3.92 4.95   HEMOGLOBIN g/dL 8.8*  --  7.8* 8.9* 8.9* 8.3* 9.4*   HEMATOCRIT % 27.6*  --  25.3* 28.5* 28.0* 27.2* 30.2*   PLATELETS 10*3/mm3 254  --   --   --  240 145 236   SODIUM mmol/L  --  131* 134*  --  138 134* 137   POTASSIUM mmol/L  --  4.6 5.0  --  4.5 4.7 4.7   CHLORIDE  mmol/L  --  98 100  --  103 105 103   CO2 mmol/L  --  18.0* 19.0*  --  21.0* 21.0* 23.0   BUN mg/dL  --  46* 44*  --  39* 43* 40*   CREATININE mg/dL  --  1.58* 1.81*  --  1.52* 1.34* 1.46*   GLUCOSE mg/dL  --  193* 282*  --  135* 199* 158*   CALCIUM mg/dL  --  9.0 8.7  --  8.7 8.3* 8.5*     Results from last 7 days   Lab Units 08/04/19  1323 08/03/19  0545 08/02/19  0519 08/01/19  0924 07/31/19  0451 07/30/19  0359 07/29/19 2036 07/29/19  0737   BILIRUBIN mg/dL  --   --   --  0.2  --  0.2 0.2  --    ALK PHOS U/L  --   --   --  92  --  87 105  --    ALT (SGPT) U/L  --   --   --  14  --  16 19  --    AST (SGOT) U/L  --   --   --  19  --  24 26  --    PROTIME Seconds 20.9* 22.0* 22.3* 20.1* 19.7* 15.7*  --  22.8*   INR  1.89* 2.02* 2.06* 1.80* 1.76* 1.31*  --  2.11*           Invalid input(s): TG, LDLCALC, LDLREALC  Results from last 7 days   Lab Units 07/29/19 2036   TROPONIN T ng/mL 0.017   LACTATE mmol/L 1.7       Brief Urine Lab Results  (Last result in the past 365 days)      Color   Clarity   Blood   Leuk Est   Nitrite   Protein   CREAT   Urine HCG        07/26/19 2125 Yellow Cloudy Large (3+) Large (3+) Negative Trace               Microbiology Results Abnormal     Procedure Component Value - Date/Time    Blood Culture - Blood, Arm, Left [[id number]]  (Abnormal) Collected:  07/26/19 1550    Lab Status:  Final result Specimen:  Blood from Arm, Left Updated:  07/29/19 0602     Blood Culture Escherichia coli     Comment: Refer to previous blood culture collected on 7/26/2019 1540 for MICs          Gram Stain Aerobic Bottle Gram negative bacilli      Anaerobic Bottle Gram negative bacilli    Blood Culture - Blood, Hand, Right [[id number]]  (Abnormal)  (Susceptibility) Collected:  07/26/19 1540    Lab Status:  Final result Specimen:  Blood from Hand, Right Updated:  07/29/19 0559     Blood Culture Escherichia coli     Gram Stain Aerobic Bottle Gram negative bacilli      Anaerobic Bottle Gram negative bacilli     Susceptibility      Escherichia coli     EMANUEL     Ampicillin Susceptible     Ampicillin + Sulbactam Susceptible     Cefazolin Susceptible     Cefepime Susceptible     Ceftazidime Susceptible     Ceftriaxone Susceptible     Gentamicin Susceptible     Levofloxacin Susceptible     Piperacillin + Tazobactam Susceptible     Trimethoprim + Sulfamethoxazole Susceptible                    Urine Culture - Urine, Urine, Clean Catch [[id number]]  (Abnormal)  (Susceptibility) Collected:  07/26/19 2125    Lab Status:  Final result Specimen:  Urine, Clean Catch Updated:  07/28/19 1144     Urine Culture >100,000 CFU/mL Escherichia coli    Susceptibility      Escherichia coli     EMANUEL     Ampicillin Susceptible     Ampicillin + Sulbactam Susceptible     Cefazolin Susceptible     Cefepime Susceptible     Ceftazidime Susceptible     Ceftriaxone Susceptible     Gentamicin Susceptible     Levofloxacin Susceptible     Nitrofurantoin Susceptible     Piperacillin + Tazobactam Susceptible     Tetracycline Susceptible     Trimethoprim + Sulfamethoxazole Susceptible                    Blood Culture ID, PCR - Blood, Hand, Right [[id number]]  (Abnormal) Collected:  07/26/19 1540    Lab Status:  Final result Specimen:  Blood from Hand, Right Updated:  07/27/19 0843     BCID, PCR Escherichia coli. Identification by BCID PCR.          Imaging Results (all)     Procedure Component Value Units Date/Time    US Renal Limited [[id number]] Collected:  08/03/19 1223     Updated:  08/03/19 1648    Narrative:       EXAMINATION: US RENAL LIMITED - 08/03/2019     INDICATION: E11.10-Type 2 diabetes mellitus with ketoacidosis without  coma.     TECHNIQUE: Grayscale and color Doppler ultrasonographic images of the  kidneys were performed. Ultrasound technologist noted this examination  was difficult secondary to bedside nature of the exam and the patient  unable to hold her breath.     COMPARISON: CT abdomen and pelvis 07/27/2019.     FINDINGS:      The right  kidney measures 11.1 x 4.4 x 4.4 cm. No hydronephrosis or  other renal mass or perinephric abscess identified.     The left kidney measures 10.3 x 4.6 x 4.9 cm. No evidence of  hydronephrosis or other renal mass or perinephric abscess identified.     Visualized portions of the urinary bladder are unremarkable.          Impression:          No evidence of hydronephrosis, renal mass, or perinephric abscess.     Loss of normal cortical medullary differentiation suggesting underlying  medical renal disease. Correlation with laboratory values recommended.     The urinary bladder is partially decompressed.      DICTATED:   08/03/2019  EDITED/ls :   08/03/2019        CT Abdomen Pelvis Without Contrast [[id number]] Collected:  07/27/19 1403     Updated:  07/30/19 1725    Narrative:       EXAMINATION: CT ABDOMEN/PELVIS WO CONTRAST - 07/27/2019      INDICATION: Abd pain, fever.     TECHNIQUE: Unenhanced CT imaging of abdomen and pelvis was performed  with oral contrast.     The radiation dose reduction device was turned on for each scan per the  ALARA (As Low as Reasonably Achievable) protocol.     COMPARISON: NONE     FINDINGS:      The visualized lower lungs demonstrate a small right-sided partially  loculated pleural effusion. Trace left pleural effusion is identified.  Probable round atelectasis noted within the left lower lobe. Hazy  airspace changes are also noted involving the bilateral lobes which  could relate to pneumonia. Incompletely imaged valve device likely  within the aorta. The heart is enlarged. Additional mitral valve changes  identified.     The liver does not demonstrate acute abnormality. Mild intrahepatic  biliary ductal dilation identified. Hypodensity noted within the  gallbladder. No CT evidence of acute cholecystitis. The spleen is not  enlarged. The pancreas does not demonstrate abnormality. No  peripancreatic fluid collection identified. Lack of intra-abdominal fat  limits evaluation of visceral  structures.     Kidneys are symmetric in size. Prominence of the left renal collecting  system is identified. Prominence of the left ureter is identified. No  evidence of obstructive uropathy. The right ureteral system is not  enlarged. Urinary bladder is partially distended and demonstrates mild  wall thickening.     Distal circumferential esophageal wall thickening identified. The  stomach is decompressed and otherwise unremarkable. Contrast-filled  loops of small bowel are identified, none of which are particularly  enlarged. The largest measures up to 2.2 cm. No evidence of bowel  obstruction. The appendix is not definitively identified secondary to  paucity of intra-abdominal fat. Mild colonic wall thickening identified  particularly involving the distal sigmoid colon. Additional rectal ball  is identified within the distal rectum measuring up to 6.1 cm with mild  rectal wall thickening and perirectal inflammation suggesting proctitis.     The osseous structures appear intact. Degenerative changes the lumbar  spine are identified maximally at L4-L5 with there is grade 1 anterior  listhesis of L4 on L5. No pars and articularis defects identified.  Multilevel vacuum disc phenomena identified. Nonspecific prominent  inguinal lymph nodes with diffuse edema involving the soft tissues.       Impression:          Nonspecific multifocal colonic wall thickening predominantly involving  the distal sigmoid colon and rectum with a 6.1 cm rectal stool ball with  surrounding perirectal inflammation. This is concerning for distal  colitis/stercoral colitis.     Mild prominence of the left ureteral system without evidence for  obstructive uropathy. Mild perinephric inflammation identified.  Correlate for the possibility of underlying ureteritis/pyelonephritis.     Nonvisualization of the appendix.     Bilateral lower lobe trace pleural effusions with lower lobe airspace  disease.     Additional nonspecific distal esophageal  wall thickening.     DICTATED:   07/27/2019  EDITED/ls :   07/27/2019      This report was finalized on 7/30/2019 5:22 PM by Dr. Elias Juarez MD.       XR Abdomen KUB [[id number]] Collected:  07/29/19 1958     Updated:  07/29/19 2000    Narrative:       ABDOMEN, 7/29/2019      HISTORY:    64-year-old female hospital inpatient with ongoing abdomen pain. Diabetes with ketoacidosis.      TECHNIQUE:  AP radiographs of the abdomen and pelvis.    COMPARISON:  *  CT abdomen/pelvis, 7/27/2019.    FINDINGS:  Bowel gas pattern is within normal limits. There is no visible bowel dilatation to suggest obstruction. The stomach is nondistended. No visible radiopaque abdominal calculi.      Impression:       Negative abdomen.    Signer Name: Ralph Smith MD   Signed: 7/29/2019 7:58 PM   Workstation Name: [email or web address]-PC       XR Chest 1 View [[id number]] Collected:  07/26/19 2318     Updated:  07/26/19 2320    Narrative:       Chest 1 view 7/26/2019    INDICATION: Shortness of breath today, aspiration. Type 2 diabetes with ketoacidosis. Benign essential hypertension.    FINDINGS: The heart is enlarged but stable following median sternotomy compared with the previous chest radiograph performed earlier today at 9:08 PM. There are bilateral pleural effusions layering posteriorly with bibasilar atelectasis or infiltrates.  No pneumothorax. Surgical chain sutures left upper lobe.      Impression:       No interval change compared with the previous chest radiograph performed earlier today at 9:08 PM.    Signer Name: Brent Moraes MD   Signed: 7/26/2019 11:18 PM   Workstation Name: [id number]       XR Chest 1 View [[id number]] Collected:  07/26/19 2123     Updated:  07/26/19 2125    Narrative:       CR Chest 1 Vw    INDICATION:   Diabetic ketoacidosis, hypertension, chronic kidney disease. Evaluate for infectious process     COMPARISON:    Chest film 5/11/2015    FINDINGS:  Portable AP view of the chest.  There is median sternotomy  change with mild cardiac megaly unchanged. There is pulmonary venous distention with mild perihilar and basilar interstitial change similar to prior study. There is pleural prominence laterally  in the right greater than left hemithorax likely representing increased fat rather than fluid. There is trace blunting of both costophrenic sulci similar to prior study which could represent chronic pleural fluid or pleural thickening. There is a suture  line in the left upper lung unchanged graft      Impression:       No definite change from prior study. There is mild blunting of both costophrenic sulci which could indicate chronic pleural thickening or pleural fluid. There is mild perihilar interstitial prominence which appears chronic. There is postoperative change  with a vertically oriented suture line in the left upper lung    Signer Name: Chani Avina MD   Signed: 7/26/2019 9:23 PM   Workstation Name: [id number]             Results for orders placed during the hospital encounter of 07/26/19   Duplex Venous Lower Extremity - Bilateral CAR    Narrative · Normal bilateral lower extremity venous duplex scan.          Results for orders placed during the hospital encounter of 07/26/19   Duplex Venous Lower Extremity - Bilateral CAR    Narrative · Normal bilateral lower extremity venous duplex scan.                 Discharge Details        Discharge Medications      New Medications      Instructions Start Date   ertapenem 1 gm/100ml solution IV  Commonly known as:  INVanz   1 g, Intravenous, Every 24 Hours Scheduled         Changes to Medications      Instructions Start Date   fluticasone-salmeterol 500-50 MCG/DOSE DISKUS  Commonly known as:  ADVAIR  What changed:  how much to take   1 puff, Inhalation, 2 Times Daily - RT      furosemide 20 MG tablet  Commonly known as:  LASIX  What changed:  when to take this   20 mg, Oral, Daily      insulin aspart 100 UNIT/ML solution pen-injector sc pen  Commonly known as:   NOVOLOG FLEXPEN  What changed:  how much to take   10 Units, Subcutaneous, 3 Times Daily With Meals      insulin glargine 100 UNIT/ML injection  Commonly known as:  LANTUS  What changed:  how much to take   12 Units, Subcutaneous, 2 Times Daily      saccharomyces boulardii 250 MG capsule  Commonly known as:  FLORASTOR  What changed:  when to take this   250 mg, Oral, 2 Times Daily         Continue These Medications      Instructions Start Date   acetaminophen 325 MG tablet  Commonly known as:  TYLENOL   975 mg, Oral, Every 6 Hours PRN      allopurinol 300 MG tablet  Commonly known as:  ZYLOPRIM   300 mg, Oral, Daily, (Dose Increase 7/26/19.  Filled but has not started yet as of 7/26/19)       aspirin 81 MG EC tablet   81 mg, Oral, Daily      carvedilol 6.25 MG tablet  Commonly known as:  COREG   6.25 mg, Oral, 2 Times Daily With Meals      ferrous sulfate 325 (65 FE) MG tablet   325 mg, Oral, Daily With Breakfast      FLINTSTONES GUMMIES PO   1 tablet, Oral, Daily      fluticasone 50 MCG/ACT nasal spray  Commonly known as:  FLONASE   2 sprays, Nasal, Daily PRN      hydrALAZINE 50 MG tablet  Commonly known as:  APRESOLINE   50 mg, Oral, 2 Times Daily      isosorbide mononitrate 30 MG 24 hr tablet  Commonly known as:  IMDUR   30 mg, Oral, Daily      levothyroxine 150 MCG tablet  Commonly known as:  SYNTHROID, LEVOTHROID   150 mcg, Oral, Daily      nitroglycerin 0.4 MG SL tablet  Commonly known as:  NITROSTAT   0.4 mg, Sublingual, Every 5 Minutes PRN, Take no more than 3 doses in 15 minutes.       ondansetron ODT 4 MG disintegrating tablet  Commonly known as:  ZOFRAN-ODT   4 mg, Oral, Every 8 Hours PRN      pantoprazole 40 MG EC tablet  Commonly known as:  PROTONIX   40 mg, Oral, Daily      simvastatin 40 MG tablet  Commonly known as:  ZOCOR   40 mg, Oral, Nightly      warfarin 7.5 MG tablet  Commonly known as:  COUMADIN   7.5 mg, Oral, See Admin Instructions, -Take 3.75mg on Sun/Mon/Wed/Fri. -Take 7.5 mg on  Tue/Thur/Sat.      warfarin 7.5 MG tablet  Commonly known as:  COUMADIN   3.75 mg, Oral, See Admin Instructions, -Take 3.75mg on Sun/Mon/Wed/Fri. -Take 7.5 mg on Tue/Thur/Sat.              Allergies   Allergen Reactions   • Doxepin Hcl Rash         Discharge Disposition:  Home or Self Care    Discharge Diet:  Diet Order   Procedures   • Diet Regular; Consistent Carbohydrate, Cardiac         Discharge Activity:   Activity Instructions     Activity as Tolerated              CODE STATUS:    Code Status and Medical Interventions:   Ordered at: 07/26/19 4310     Code Status:    CPR     Medical Interventions (Level of Support Prior to Arrest):    Full         No future appointments.    Additional Instructions for the Follow-ups that You Need to Schedule     Discharge Follow-up with PCP   As directed       Currently Documented PCP:    Melissa Cabrales MD    PCP Phone Number:    [phone]     Follow Up Details:  1 week         Discharge Follow-up with Specified Provider: Dr. Miller as instructed   As directed      To:  Dr. Miller as instructed         Discharge Follow-up with Specified Provider: Dr. Tavera (primary nephrologist) in 2 weeks   As directed      To:  Dr. Tavera (primary nephrologist) in 2 weeks               Time Spent on Discharge:  35 minutes    Electronically signed by Lizzette Swan MD, 08/04/19, 2:07 PM

## 2019-08-04 NOTE — PROGRESS NOTES
"Pharmacy Consult  -  Warfarin    Kasia Stewart is a  64 y.o. female   Height - 157.5 cm (62\")  Weight - 74.8 kg (165 lb)    Consulting Provider: - Hospitalist  Indication: - aortic Mechanical Valve  Goal INR: - 2-3  Home Regimen:   - Warfarin 3.75mg Sunday, Monday, Wednesday, Friday             - Warfarin 7.5mg Tuesday, Thursday, Saturday    Bridge Therapy: enoxaparin bridge d/c'ed 8/2    Drug-Drug Interactions with current regimen:   Aspirin - increased bleeding risk              Levothyroxine - increased bleeding risk              Pantoprazole - may increase INR    Warfarin Dosing During Admission:    Date  7/26 7/27 7/28 7/29 7/30 7/31 8/1 8/2 8/3 8/4       INR  2.63 3.47 3.47 2.11 1.31 1.76 1.8 2.06 2.02 1.89       Dose  3.75 Hold dose Hold dose 7.5mg 7.5mg  3.75mg 7.5mg 3.75 7.5mg (3.75mg)           Education Provided: Patient is on warfarin prior to admission.  Education provided 7/29 verbally and in witing.  Discussed effects of warfarin, importance of checking INR, drug-drug and drug-food interactions, and signs/symptoms of bleeding and clotting.  Patient verbalized understanding through teach back.  All pertinent questions were answered.        Discharge Follow up:   Following Provider - Dr. Abdullahi White   Follow up time range or appointment - 2-3 days following discharge      Labs:    Results from last 7 days   Lab Units 08/04/19  1323 08/03/19  0545 08/02/19  0519 08/01/19  0924 07/31/19  0451 07/30/19  0359 07/29/19  2036 07/29/19  0737   INR  1.89* 2.02* 2.06* 1.80* 1.76* 1.31*  --  2.11*   HEMOGLOBIN g/dL 8.8* 7.8* 8.9* 8.9*  --  8.3* 9.4*  --    HEMATOCRIT % 27.6* 25.3* 28.5* 28.0*  --  27.2* 30.2*  --    PLATELETS 10*3/mm3 254  --   --  240  --  145 236  --      Results from last 7 days   Lab Units 08/04/19  1037 08/03/19  0545 08/01/19  0924 07/30/19  0359 07/29/19 2036   SODIUM mmol/L 131* 134* 138 134* 137   POTASSIUM mmol/L 4.6 5.0 4.5 4.7 4.7   CHLORIDE mmol/L 98 100 103 105 103   CO2 " mmol/L 18.0* 19.0* 21.0* 21.0* 23.0   BUN mg/dL 46* 44* 39* 43* 40*   CREATININE mg/dL 1.58* 1.81* 1.52* 1.34* 1.46*   CALCIUM mg/dL 9.0 8.7 8.7 8.3* 8.5*   BILIRUBIN mg/dL  --   --  0.2 0.2 0.2   ALK PHOS U/L  --   --  92 87 105   ALT (SGPT) U/L  --   --  14 16 19   AST (SGOT) U/L  --   --  19 24 26   GLUCOSE mg/dL 193* 282* 135* 199* 158*       Current dietary intake: % of documented meals  Diet Order   Procedures   • Diet Regular; Consistent Carbohydrate, Cardiac         Assessment/Plan:     Warfarin being dosed for mechanical aortic valve with INR goal 2-3.     1. Patient's INR is SUBtherapeutic today at 1.89. Represents a decrease in INR when compared to yesterday.  2. Will give warfarin 3.75 mg today.  3. Daily PT/INR ordered.  4. Monitor signs/symptoms of bleeding, dietary intake, and drug-drug interactions. Make dose adjustments as necessary.  5. Pharmacy will continue to follow.    Thanks,  Kal Kelly PharmD, JATINDER  Pharmacy Resident  8/4/2019  2:07 PM

## 2019-08-05 ENCOUNTER — READMISSION MANAGEMENT (OUTPATIENT)
Dept: CALL CENTER | Facility: HOSPITAL | Age: 64
End: 2019-08-05

## 2019-08-05 NOTE — OUTREACH NOTE
Prep Survey      Responses   Facility patient discharged from?  [place]   Is patient eligible?  Yes   Discharge diagnosis  DKA w/o coma DM II, anemia of chronic disease, hx heart valve replacement, chronic anticoagulation, HTN, CKD III   Does the patient have one of the following disease processes/diagnoses(primary or secondary)?  Other   Does the patient have Home health ordered?  No   Is there a DME ordered?  No   Comments regarding appointments  See AVS   Prep survey completed?  Yes          Dalila Marks RN

## 2019-08-06 ENCOUNTER — READMISSION MANAGEMENT (OUTPATIENT)
Dept: CALL CENTER | Facility: HOSPITAL | Age: 64
End: 2019-08-06

## 2019-08-06 NOTE — OUTREACH NOTE
Medical Week 1 Survey      Responses   Facility patient discharged from?  [facility]   Does the patient have one of the following disease processes/diagnoses(primary or secondary)?  Other   Is there a successful TCM telephone encounter documented?  No   Week 1 attempt successful?  Yes   Call start time  0740   Rescheduled  Rescheduled-pt requested [She states she is swollen she is unable to wear her shoes, she states her stomach is swollen  and she is SOB. Her granddaughter is on her way to take her to the ED. ]   Call end time  0747   Discharge diagnosis  DKA w/o coma DM II, anemia of chronic disease, hx heart valve replacement, chronic anticoagulation, HTN, CKD III   Is patient permission given to speak with other caregiver?  No   List who call center can speak with  no one           Neelima Gutiérrez RN

## 2019-08-07 ENCOUNTER — READMISSION MANAGEMENT (OUTPATIENT)
Dept: CALL CENTER | Facility: HOSPITAL | Age: 64
End: 2019-08-07

## 2019-08-07 NOTE — OUTREACH NOTE
Medical Week 1 Survey      Responses   Facility patient discharged from?  [facility]   Does the patient have one of the following disease processes/diagnoses(primary or secondary)?  Other   Is there a successful TCM telephone encounter documented?  No   Week 1 attempt successful?  Yes   Call start time  1203   Revoke  Decline to participate   Call end time  1204   Discharge diagnosis  DKA w/o coma DM II, anemia of chronic disease, hx heart valve replacement, chronic anticoagulation, HTN, CKD III   Is patient permission given to speak with other caregiver?  Yes   List who call center can speak with  no one           Holli Sauceda RN

## 2019-08-28 ENCOUNTER — APPOINTMENT (OUTPATIENT)
Dept: DIABETES SERVICES | Facility: HOSPITAL | Age: 64
End: 2019-08-28

## 2020-01-21 ENCOUNTER — TRANSCRIBE ORDERS (OUTPATIENT)
Dept: LAB | Facility: HOSPITAL | Age: 65
End: 2020-01-21

## 2020-01-21 ENCOUNTER — LAB (OUTPATIENT)
Dept: LAB | Facility: HOSPITAL | Age: 65
End: 2020-01-21

## 2020-01-21 DIAGNOSIS — D49.2 ODONTOGENIC TUMOR: Primary | ICD-10-CM

## 2020-01-21 PROCEDURE — 87205 SMEAR GRAM STAIN: CPT

## 2020-01-21 PROCEDURE — 87116 MYCOBACTERIA CULTURE: CPT

## 2020-01-21 PROCEDURE — 87102 FUNGUS ISOLATION CULTURE: CPT

## 2020-01-21 PROCEDURE — 87206 SMEAR FLUORESCENT/ACID STAI: CPT

## 2020-01-21 PROCEDURE — 87070 CULTURE OTHR SPECIMN AEROBIC: CPT

## 2020-01-23 LAB — GIE STN SPEC: NORMAL

## 2020-01-24 LAB
BACTERIA SPEC AEROBE CULT: NORMAL
GRAM STN SPEC: NORMAL

## 2020-03-03 LAB
FUNGUS WND CULT: NORMAL
MYCOBACTERIUM SPEC CULT: NORMAL
NIGHT BLUE STAIN TISS: NORMAL